# Patient Record
Sex: MALE | Race: WHITE | NOT HISPANIC OR LATINO | Employment: FULL TIME | URBAN - NONMETROPOLITAN AREA
[De-identification: names, ages, dates, MRNs, and addresses within clinical notes are randomized per-mention and may not be internally consistent; named-entity substitution may affect disease eponyms.]

---

## 2018-01-04 ENCOUNTER — OFFICE VISIT (OUTPATIENT)
Dept: RETAIL CLINIC | Facility: CLINIC | Age: 33
End: 2018-01-04

## 2018-01-04 VITALS
WEIGHT: 152 LBS | HEIGHT: 69 IN | OXYGEN SATURATION: 99 % | RESPIRATION RATE: 20 BRPM | TEMPERATURE: 98.1 F | HEART RATE: 88 BPM | BODY MASS INDEX: 22.51 KG/M2

## 2018-01-04 DIAGNOSIS — J06.9 VIRAL UPPER RESPIRATORY TRACT INFECTION: Primary | ICD-10-CM

## 2018-01-04 PROCEDURE — 99203 OFFICE O/P NEW LOW 30 MIN: CPT | Performed by: NURSE PRACTITIONER

## 2018-01-04 RX ORDER — CETIRIZINE HYDROCHLORIDE 10 MG/1
10 TABLET ORAL DAILY
COMMUNITY

## 2018-01-04 RX ORDER — DEXTROMETHORPHAN HYDROBROMIDE AND PROMETHAZINE HYDROCHLORIDE 15; 6.25 MG/5ML; MG/5ML
5 SYRUP ORAL 4 TIMES DAILY PRN
Qty: 120 ML | Refills: 0 | Status: SHIPPED | OUTPATIENT
Start: 2018-01-04 | End: 2018-01-11

## 2018-01-04 RX ORDER — AMOXICILLIN 500 MG/1
500 CAPSULE ORAL 3 TIMES DAILY
Qty: 30 CAPSULE | Refills: 0 | Status: SHIPPED | OUTPATIENT
Start: 2018-01-04 | End: 2018-01-14

## 2018-01-04 NOTE — PROGRESS NOTES
"Valdo Marino is a 32 y.o. male.   Chief Complaint   Patient presents with   • URI      URI    This is a new problem. The current episode started yesterday. The problem has been gradually worsening. There has been no fever. Associated symptoms include congestion, coughing, rhinorrhea and a sore throat. Pertinent negatives include no ear pain, rash, sinus pain or wheezing. Associated symptoms comments: weakness. He has tried acetaminophen for the symptoms. The treatment provided mild relief.        The following portions of the patient's history were reviewed and updated as appropriate: allergies, current medications, past family history, past medical history, past social history, past surgical history and problem list.    Current Outpatient Prescriptions:   •  cetirizine (zyrTEC) 10 MG tablet, Take 10 mg by mouth Daily., Disp: , Rfl:   •  amoxicillin (AMOXIL) 500 MG capsule, Take 1 capsule by mouth 3 (Three) Times a Day for 10 days., Disp: 30 capsule, Rfl: 0  •  promethazine-dextromethorphan (PROMETHAZINE-DM) 6.25-15 MG/5ML syrup, Take 5 mL by mouth 4 (Four) Times a Day As Needed for Cough for up to 7 days., Disp: 120 mL, Rfl: 0    Review of Systems   Constitutional: Positive for activity change, appetite change, chills and fatigue.   HENT: Positive for congestion, postnasal drip, rhinorrhea and sore throat. Negative for ear pain, sinus pain and sinus pressure.    Eyes: Negative.    Respiratory: Positive for cough. Negative for apnea, choking, chest tightness, shortness of breath, wheezing and stridor.    Cardiovascular: Negative.    Gastrointestinal: Negative.    Skin: Negative for rash.     Pulse 88  Temp 98.1 °F (36.7 °C) (Temporal Artery )   Resp 20  Ht 175.3 cm (69\")  Wt 68.9 kg (152 lb)  SpO2 99%  BMI 22.45 kg/m2    Objective   No Known Allergies    Physical Exam   Constitutional: He is oriented to person, place, and time. He appears well-developed and well-nourished. He has a sickly " appearance. No distress.   HENT:   Head: Normocephalic.   Right Ear: External ear normal.   Left Ear: External ear normal.   Nose: Mucosal edema and rhinorrhea present. No sinus tenderness, nasal deformity, septal deviation or nasal septal hematoma. Right sinus exhibits no maxillary sinus tenderness and no frontal sinus tenderness. Left sinus exhibits no maxillary sinus tenderness and no frontal sinus tenderness.   Mouth/Throat: Posterior oropharyngeal erythema present.   Eyes: Conjunctivae are normal.   Neck: Normal range of motion. Neck supple. No JVD present. No tracheal deviation present. No thyromegaly present.   Cardiovascular: Normal rate, regular rhythm and normal heart sounds.  Exam reveals no friction rub.    No murmur heard.  Pulmonary/Chest: Effort normal and breath sounds normal. No stridor. No respiratory distress. He has no wheezes. He has no rales. He exhibits no tenderness.   Lymphadenopathy:     He has no cervical adenopathy.   Neurological: He is alert and oriented to person, place, and time.   Skin: Skin is warm. He is not diaphoretic.   Psychiatric: He has a normal mood and affect. His behavior is normal.   Vitals reviewed.      Assessment/Plan   Cliff was seen today for uri.    Diagnoses and all orders for this visit:    Viral upper respiratory tract infection    Other orders  -     promethazine-dextromethorphan (PROMETHAZINE-DM) 6.25-15 MG/5ML syrup; Take 5 mL by mouth 4 (Four) Times a Day As Needed for Cough for up to 7 days.  -     amoxicillin (AMOXIL) 500 MG capsule; Take 1 capsule by mouth 3 (Three) Times a Day for 10 days.        An After Visit Summary was printed, reviewed, and given to the patient. Understanding verbalized and agrees with treatment plan.  If no improvement or becomes worse, follow up with primary or go to [facility]. Your illness appears viral at this time and will not respond to antibiotics.  Pt voices concern that illness will become bacterial and they will need an  antibiotic.  Antibiotic is prescribed today with the agreement that pt will wait 3-4 days to fill it, and will begin taking it only if illness is not improving or is worse at that time. Pt verbalizes understanding and agrees with treatment plan.           January 4, 2018 5:02 PM

## 2018-01-04 NOTE — PATIENT INSTRUCTIONS
"Your illness appears viral at this time and will not respond to antibiotics.  Pt voices concern that illness will become bacterial and they will need an antibiotic.  Antibiotic is prescribed today with the agreement that pt will wait 3-4 days to fill it, and will begin taking it only if illness is not improving or is worse at that time. Pt verbalizes understanding and agrees with treatment plan.        Upper Respiratory Infection, Adult  Most upper respiratory infections (URIs) are caused by a virus. A URI affects the nose, throat, and upper air passages. The most common type of URI is often called \"the common cold.\"  HOME CARE   · Take medicines only as told by your doctor.  · Gargle warm saltwater or take cough drops to comfort your throat as told by your doctor.  · Use a warm mist humidifier or inhale steam from a shower to increase air moisture. This may make it easier to breathe.  · Drink enough fluid to keep your pee (urine) clear or pale yellow.  · Eat soups and other clear broths.  · Have a healthy diet.  · Rest as needed.  · Go back to work when your fever is gone or your doctor says it is okay.  ¨ You may need to stay home longer to avoid giving your URI to others.  ¨ You can also wear a face mask and wash your hands often to prevent spread of the virus.  · Use your inhaler more if you have asthma.  · Do not use any tobacco products, including cigarettes, chewing tobacco, or electronic cigarettes. If you need help quitting, ask your doctor.  GET HELP IF:  · You are getting worse, not better.  · Your symptoms are not helped by medicine.  · You have chills.  · You are getting more short of breath.  · You have brown or red mucus.  · You have yellow or brown discharge from your nose.  · You have pain in your face, especially when you bend forward.  · You have a fever.  · You have puffy (swollen) neck glands.  · You have pain while swallowing.  · You have white areas in the back of your throat.  GET HELP RIGHT " AWAY IF:   · You have very bad or constant:    Headache.    Ear pain.    Pain in your forehead, behind your eyes, and over your cheekbones (sinus pain).    Chest pain.  · You have long-lasting (chronic) lung disease and any of the following:    Wheezing.    Long-lasting cough.    Coughing up blood.    A change in your usual mucus.  · You have a stiff neck.  · You have changes in your:    Vision.    Hearing.    Thinking.    Mood.  MAKE SURE YOU:   · Understand these instructions.  · Will watch your condition.  · Will get help right away if you are not doing well or get worse.     This information is not intended to replace advice given to you by your health care provider. Make sure you discuss any questions you have with your health care provider.     Document Released: 06/05/2009 Document Revised: 05/03/2016 Document Reviewed: 03/25/2015  [facility] Interactive Patient Education ©2017 [employer]

## 2020-03-12 ENCOUNTER — OFFICE VISIT (OUTPATIENT)
Dept: RETAIL CLINIC | Facility: CLINIC | Age: 35
End: 2020-03-12

## 2020-03-12 VITALS
HEIGHT: 69 IN | SYSTOLIC BLOOD PRESSURE: 142 MMHG | HEART RATE: 72 BPM | OXYGEN SATURATION: 98 % | RESPIRATION RATE: 18 BRPM | BODY MASS INDEX: 26.28 KG/M2 | TEMPERATURE: 97.9 F | WEIGHT: 177.4 LBS | DIASTOLIC BLOOD PRESSURE: 86 MMHG

## 2020-03-12 DIAGNOSIS — J02.9 ACUTE PHARYNGITIS, UNSPECIFIED ETIOLOGY: Primary | ICD-10-CM

## 2020-03-12 LAB
EXPIRATION DATE: NORMAL
INTERNAL CONTROL: NORMAL
Lab: NORMAL
S PYO AG THROAT QL: NEGATIVE

## 2020-03-12 PROCEDURE — 99213 OFFICE O/P EST LOW 20 MIN: CPT | Performed by: NURSE PRACTITIONER

## 2020-03-12 PROCEDURE — 87880 STREP A ASSAY W/OPTIC: CPT | Performed by: NURSE PRACTITIONER

## 2020-03-12 RX ORDER — CYCLOBENZAPRINE HCL 5 MG
TABLET ORAL AS NEEDED
COMMUNITY
Start: 2020-02-05

## 2020-03-12 RX ORDER — TRAMADOL HYDROCHLORIDE 50 MG/1
TABLET ORAL
COMMUNITY
Start: 2020-03-03

## 2020-03-12 RX ORDER — SULINDAC 150 MG/1
TABLET ORAL EVERY 12 HOURS
COMMUNITY
Start: 2019-04-24 | End: 2020-08-25

## 2020-03-12 RX ORDER — FLUTICASONE PROPIONATE 50 MCG
2 SPRAY, SUSPENSION (ML) NASAL DAILY
COMMUNITY

## 2020-03-12 RX ORDER — GABAPENTIN 400 MG/1
400 CAPSULE ORAL 3 TIMES DAILY
COMMUNITY
Start: 2020-03-03

## 2020-03-12 RX ORDER — CLONAZEPAM 0.5 MG/1
TABLET ORAL
COMMUNITY
Start: 2020-03-03

## 2020-03-12 NOTE — PROGRESS NOTES
GREG Marino is a 34 y.o. male.   Chief Complaint   Patient presents with   • Sinusitis      Sinusitis   This is a new problem. The current episode started in the past 7 days. The problem has been waxing and waning since onset. There has been no fever. Associated symptoms include congestion (nasal), coughing (non-productive), headaches (occassional), sinus pressure and a sore throat. Pertinent negatives include no ear pain. Past treatments include nothing.      Cliff Marino  presents to [facility] with cc of sinus congestion and sore throat for 3 days, denies fever. Reviewed the PMFSH. See ROS.  The following portions of the patient's history were reviewed and updated as appropriate: allergies, current medications, past family history, past medical history, past social history, past surgical history and problem list.    Current Outpatient Medications:   •  cetirizine (zyrTEC) 10 MG tablet, Take 10 mg by mouth Daily., Disp: , Rfl:   •  clonazePAM (KlonoPIN) 0.5 MG tablet, , Disp: , Rfl:   •  cyclobenzaprine (FLEXERIL) 5 MG tablet, As Needed., Disp: , Rfl:   •  fluticasone (FLONASE) 50 MCG/ACT nasal spray, 2 sprays into the nostril(s) as directed by provider Daily., Disp: , Rfl:   •  gabapentin (NEURONTIN) 400 MG capsule, , Disp: , Rfl:   •  sulindac (CLINORIL) 150 MG tablet, Take  by mouth Every 12 (Twelve) Hours., Disp: , Rfl:   •  traMADol (ULTRAM) 50 MG tablet, , Disp: , Rfl:     No Known Allergies    Review of Systems   Constitutional: Positive for fatigue. Negative for fever.   HENT: Positive for congestion (nasal), rhinorrhea (clear), sinus pressure and sore throat. Negative for ear pain.    Respiratory: Positive for cough (non-productive). Negative for chest tightness.    Gastrointestinal: Negative for nausea.   Skin: Negative for rash.   Neurological: Positive for headaches (occassional).       Objective     Visit Vitals  /86   Pulse 72   Temp 97.9 °F (36.6 °C) (Temporal)   Resp 18   Ht 175.3  "cm (69\")   Wt 80.5 kg (177 lb 6.4 oz)   SpO2 98%   BMI 26.20 kg/m²         Physical Exam   Constitutional: He is oriented to person, place, and time. He appears well-developed and well-nourished. No distress.   HENT:   Head: Normocephalic and atraumatic.   Right Ear: Tympanic membrane and external ear normal.   Left Ear: Tympanic membrane and external ear normal.   Nose: Mucosal edema present. Right sinus exhibits no maxillary sinus tenderness and no frontal sinus tenderness. Left sinus exhibits no maxillary sinus tenderness and no frontal sinus tenderness.   Mouth/Throat: Uvula is midline and mucous membranes are normal. Posterior oropharyngeal erythema present. No oropharyngeal exudate. Tonsils are 1+ on the right. Tonsils are 1+ on the left. No tonsillar exudate.   Eyes: Pupils are equal, round, and reactive to light. Conjunctivae and EOM are normal.   Neck: Normal range of motion. Neck supple.   Cardiovascular: Normal rate, regular rhythm and normal heart sounds.   Pulmonary/Chest: Effort normal and breath sounds normal. No respiratory distress.   Abdominal: Soft. Bowel sounds are normal.   Musculoskeletal: Normal range of motion.   Lymphadenopathy:     He has no cervical adenopathy.   Neurological: He is alert and oriented to person, place, and time.   Skin: Skin is warm and dry. No rash noted.   Psychiatric: He has a normal mood and affect. His behavior is normal. Judgment and thought content normal.   Nursing note and vitals reviewed.      Lab Results (last 24 hours)     Procedure Component Value Units Date/Time    POCT rapid strep A [[id number]]  (Normal) Collected:  03/12/20 1719    Specimen:  Swab Updated:  03/12/20 1719     Rapid Strep A Screen Negative     Internal Control Passed     Lot Number [id number]     Expiration Date 4/30/21          Assessment/Plan   Cliff was seen today for sinusitis.    Diagnoses and all orders for this visit:    Acute pharyngitis, unspecified etiology  -     POCT rapid strep " A

## 2020-03-12 NOTE — PATIENT INSTRUCTIONS
Pharyngitis    Pharyngitis is redness, pain, and swelling (inflammation) of the throat (pharynx). It is a very common cause of sore throat. Pharyngitis can be caused by a bacteria, but it is usually caused by a virus. Most cases of pharyngitis get better on their own without treatment.  What are the causes?  This condition may be caused by:  · Infection by viruses (viral). Viral pharyngitis spreads from person to person (is contagious) through coughing, sneezing, and sharing of personal items or utensils such as cups, forks, spoons, and toothbrushes.  · Infection by bacteria (bacterial). Bacterial pharyngitis may be spread by touching the nose or face after coming in contact with the bacteria, or through more intimate contact, such as kissing.  · Allergies. Allergies can cause buildup of mucus in the throat (post-nasal drip), leading to inflammation and irritation. Allergies can also cause blocked nasal passages, forcing breathing through the mouth, which dries and irritates the throat.  What increases the risk?  You are more likely to develop this condition if:  · You are 5-24 years old.  · You are exposed to crowded environments such as , school, or dormitory living.  · You live in a cold climate.  · You have a weakened disease-fighting (immune) system.  What are the signs or symptoms?  Symptoms of this condition vary by the cause (viral, bacterial, or allergies) and can include:  · Sore throat.  · Fatigue.  · Low-grade fever.  · Headache.  · Joint pain and muscle aches.  · Skin rashes.  · Swollen glands in the throat (lymph nodes).  · Plaque-like film on the throat or tonsils. This is often a symptom of bacterial pharyngitis.  · Vomiting.  · Stuffy nose (nasal congestion).  · Cough.  · Red, itchy eyes (conjunctivitis).  · Loss of appetite.  How is this diagnosed?  This condition is often diagnosed based on your medical history and a physical exam. Your health care provider will ask you questions about your  illness and your symptoms. A swab of your throat may be done to check for bacteria (rapid strep test). Other lab tests may also be done, depending on the suspected cause, but these are rare.  How is this treated?  This condition usually gets better in 3-4 days without medicine. Bacterial pharyngitis may be treated with antibiotic medicines.  Follow these instructions at home:  · Take over-the-counter and prescription medicines only as told by your health care provider.  ? If you were prescribed an antibiotic medicine, take it as told by your health care provider. Do not stop taking the antibiotic even if you start to feel better.  ? Do not give children aspirin because of the association with Reye syndrome.  · Drink enough water and fluids to keep your urine clear or pale yellow.  · Get a lot of rest.  · Gargle with a salt-water mixture 3-4 times a day or as needed. To make a salt-water mixture, completely dissolve ½-1 tsp of salt in 1 cup of warm water.  · If your health care provider approves, you may use throat lozenges or sprays to soothe your throat.  Contact a health care provider if:  · You have large, tender lumps in your neck.  · You have a rash.  · You cough up green, yellow-brown, or bloody spit.  Get help right away if:  · Your neck becomes stiff.  · You drool or are unable to swallow liquids.  · You cannot drink or take medicines without vomiting.  · You have severe pain that does not go away, even after you take medicine.  · You have trouble breathing, and it is not caused by a stuffy nose.  · You have new pain and swelling in your joints such as the knees, ankles, wrists, or elbows.  Summary  · Pharyngitis is redness, pain, and swelling (inflammation) of the throat (pharynx).  · While pharyngitis can be caused by a bacteria, the most common causes are viral.  · Most cases of pharyngitis get better on their own without treatment.  · Bacterial pharyngitis is treated with antibiotic medicines.  This  information is not intended to replace advice given to you by your health care provider. Make sure you discuss any questions you have with your health care provider.  Document Released: 12/18/2006 Document Revised: 01/23/2018 Document Reviewed: 01/23/2018  [employer] Interactive Patient Education © 2020 [employer]

## 2020-08-17 ENCOUNTER — OFFICE VISIT (OUTPATIENT)
Dept: ORTHOPEDIC SURGERY | Facility: CLINIC | Age: 35
End: 2020-08-17

## 2020-08-17 VITALS
HEIGHT: 69 IN | BODY MASS INDEX: 26.29 KG/M2 | HEART RATE: 86 BPM | SYSTOLIC BLOOD PRESSURE: 127 MMHG | TEMPERATURE: 98.2 F | DIASTOLIC BLOOD PRESSURE: 77 MMHG | WEIGHT: 177.47 LBS

## 2020-08-17 DIAGNOSIS — Z01.818 PREOPERATIVE TESTING: ICD-10-CM

## 2020-08-17 DIAGNOSIS — G56.01 CARPAL TUNNEL SYNDROME OF RIGHT WRIST: Primary | ICD-10-CM

## 2020-08-17 PROCEDURE — 99203 OFFICE O/P NEW LOW 30 MIN: CPT | Performed by: ORTHOPAEDIC SURGERY

## 2020-08-17 NOTE — PROGRESS NOTES
History & Physical      Patient: Cliff Marino  YOB: 1985  Date of Encounter: 2020        Chief Complaint:   Chief Complaint   Patient presents with   • Right Hand - Numbness, Pain, Tingling, Initial Evaluation   • Left Hand - Tingling, Pain, Numbness, Initial Evaluation           HPI:   Cliff Marino, 34 y.o. male, referred by BRIJESH Peralta presents evaluation of bilateral hand pain and numbness he has had this for at least 5 years.  Right is remained worse than the left he has worn night braces over 2 years and reports it makes his hands better but tubes do not resolve.  He reports pain at night numbness and tingling in all fingers but greatest thumb index and middle fingers he reports he is losing his  strength and he has difficulty with driving vehicle because of the numbness and tingling.  Been on gabapentin for the past 4 years.  He has been on tramadol the past 2 years.        Active Problem List:  Patient Active Problem List   Diagnosis   • Carpal tunnel syndrome of right wrist           Past Medical History:  Past Medical History:   Diagnosis Date   • Allergic rhinitis    • Carpal tunnel syndrome    • Fibromyalgia            Past Surgical History:  Past Surgical History:   Procedure Laterality Date   • NO PAST SURGERIES             Family History:  Family History   Problem Relation Age of Onset   • Cancer Maternal Grandfather    • Diabetes Maternal Grandfather    • Cancer Paternal Grandmother            Social History:  Social History     Socioeconomic History   • Marital status:      Spouse name: Not on file   • Number of children: Not on file   • Years of education: Not on file   • Highest education level: Not on file   Tobacco Use   • Smoking status: Former Smoker     Packs/day: 1.00     Years: 8.00     Pack years: 8.00     Types: Cigarettes     Last attempt to quit: 2011     Years since quittin.6   • Smokeless tobacco: Never Used   Substance and Sexual  Activity   • Alcohol use: No   • Drug use: No   • Sexual activity: Defer     Comment: , 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      Medications:  Current Outpatient Medications   Medication Sig Dispense Refill   • cetirizine (zyrTEC) 10 MG tablet Take 10 mg by mouth Daily.     • clonazePAM (KlonoPIN) 0.5 MG tablet      • cyclobenzaprine (FLEXERIL) 5 MG tablet As Needed.     • fluticasone (FLONASE) 50 MCG/ACT nasal spray 2 sprays into the nostril(s) as directed by provider Daily.     • gabapentin (NEURONTIN) 400 MG capsule      • traMADol (ULTRAM) 50 MG tablet      • sulindac (CLINORIL) 150 MG tablet Take  by mouth Every 12 (Twelve) Hours.       No current facility-administered medications for this visit.            Allergies:  No Known Allergies        Review of Systems:   Review of Systems   HENT: Positive for sinus pain.    Eyes: Negative.    Respiratory: Negative.    Cardiovascular: Positive for leg swelling.   Gastrointestinal: Negative.    Endocrine: Negative.    Genitourinary: Negative.    Musculoskeletal: Positive for arthralgias, back pain and joint swelling.   Skin: Negative.    Allergic/Immunologic: Negative.    Neurological: Positive for weakness and numbness.   Hematological: Negative.    Psychiatric/Behavioral: The patient is nervous/anxious.            Physical Exam:   Physical Exam   Constitutional: He is oriented to person, place, and time. He appears well-developed and well-nourished.   HENT:   Head: Normocephalic and atraumatic.   Mouth/Throat: Oropharynx is clear and moist.   Eyes: Pupils are equal, round, and reactive to light. Conjunctivae and EOM are normal.   Neck: Normal range of motion. Neck supple. No JVD present. No thyromegaly present.   Cardiovascular: Normal rate, regular rhythm and normal heart sounds. Exam reveals no gallop and no friction rub.   No murmur heard.  Pulmonary/Chest: Effort normal and breath sounds normal. No respiratory distress. He has  "no wheezes. He has no rales. He exhibits no tenderness.   Abdominal: Bowel sounds are normal.   Lymphadenopathy:     He has no cervical adenopathy.   Neurological: He is alert and oriented to person, place, and time.   Skin: Skin is warm and dry.   Psychiatric: He has a normal mood and affect.   Nursing note and vitals reviewed.    GENERAL: 34 y.o. male, alert and oriented X 3 in no acute distress.   Visit Vitals  /77   Pulse 86   Temp 98.2 °F (36.8 °C)   Ht 175.3 cm (69\")   Wt 80.5 kg (177 lb 7.5 oz)   BMI 26.21 kg/m²         Musculoskeletal:   Examination bilateral hands reveals normal thenar tone symmetrical right compared to the left he has full strength with  and pinch.  He demonstrates moderately positive Phalen sign right greater than the left.  Decreased sensation thumb index and middle fingers bilaterally right greater than left.      EMG nerve conduction studies by report scribe moderate carpal tunnel syndrome right greater than left.      Assessment & Plan:   34 y.o. male presents 5-year history of bilateral hand pain and numbness with clinical findings supporting his EMG nerve conduction studies identifying carpal tunnel syndrome of a moderate degree bilateral right greater than left we discussed his options and after that discussion he wishes to proceed with carpal tunnel release right hand he is scheduled [facility] August 27, 2020.        ICD-10-CM ICD-9-CM   1. Carpal tunnel syndrome of right wrist G56.01 354.0   2. Preoperative testing Z01.818 V72.84             Cc:   BRIJESH Peralta                This document has been electronically signed by Chidi Gastelum MD   August 20, 2020 22:40      "

## 2020-08-18 ENCOUNTER — TELEPHONE (OUTPATIENT)
Dept: ORTHOPEDIC SURGERY | Facility: CLINIC | Age: 35
End: 2020-08-18

## 2020-08-18 PROBLEM — G56.01 CARPAL TUNNEL SYNDROME OF RIGHT WRIST: Status: ACTIVE | Noted: 2020-08-18

## 2020-08-25 ENCOUNTER — LAB (OUTPATIENT)
Dept: LAB | Facility: HOSPITAL | Age: 35
End: 2020-08-25

## 2020-08-25 ENCOUNTER — APPOINTMENT (OUTPATIENT)
Dept: PREADMISSION TESTING | Facility: HOSPITAL | Age: 35
End: 2020-08-25

## 2020-08-25 DIAGNOSIS — G56.01 CARPAL TUNNEL SYNDROME OF RIGHT WRIST: ICD-10-CM

## 2020-08-25 DIAGNOSIS — Z01.818 PREOPERATIVE TESTING: ICD-10-CM

## 2020-08-25 LAB
ANION GAP SERPL CALCULATED.3IONS-SCNC: 11.3 MMOL/L (ref 5–15)
BUN SERPL-MCNC: 8 MG/DL (ref 6–20)
BUN/CREAT SERPL: 8.3 (ref 7–25)
CALCIUM SPEC-SCNC: 9.5 MG/DL (ref 8.6–10.5)
CHLORIDE SERPL-SCNC: 101 MMOL/L (ref 98–107)
CO2 SERPL-SCNC: 26.7 MMOL/L (ref 22–29)
CREAT SERPL-MCNC: 0.96 MG/DL (ref 0.76–1.27)
DEPRECATED RDW RBC AUTO: 40 FL (ref 37–54)
ERYTHROCYTE [DISTWIDTH] IN BLOOD BY AUTOMATED COUNT: 12.2 % (ref 12.3–15.4)
GFR SERPL CREATININE-BSD FRML MDRD: 89 ML/MIN/1.73
GLUCOSE SERPL-MCNC: 91 MG/DL (ref 65–99)
HCT VFR BLD AUTO: 42.5 % (ref 37.5–51)
HGB BLD-MCNC: 13.9 G/DL (ref 13–17.7)
MCH RBC QN AUTO: 29.4 PG (ref 26.6–33)
MCHC RBC AUTO-ENTMCNC: 32.7 G/DL (ref 31.5–35.7)
MCV RBC AUTO: 89.9 FL (ref 79–97)
PLATELET # BLD AUTO: 338 10*3/MM3 (ref 140–450)
PMV BLD AUTO: 9.5 FL (ref 6–12)
POTASSIUM SERPL-SCNC: 3.6 MMOL/L (ref 3.5–5.2)
RBC # BLD AUTO: 4.73 10*6/MM3 (ref 4.14–5.8)
SODIUM SERPL-SCNC: 139 MMOL/L (ref 136–145)
WBC # BLD AUTO: 8.6 10*3/MM3 (ref 3.4–10.8)

## 2020-08-25 PROCEDURE — U0004 COV-19 TEST NON-CDC HGH THRU: HCPCS

## 2020-08-25 PROCEDURE — 80048 BASIC METABOLIC PNL TOTAL CA: CPT | Performed by: ANESTHESIOLOGY

## 2020-08-25 PROCEDURE — 85027 COMPLETE CBC AUTOMATED: CPT | Performed by: ANESTHESIOLOGY

## 2020-08-25 PROCEDURE — U0002 COVID-19 LAB TEST NON-CDC: HCPCS

## 2020-08-25 PROCEDURE — C9803 HOPD COVID-19 SPEC COLLECT: HCPCS

## 2020-08-25 PROCEDURE — 36415 COLL VENOUS BLD VENIPUNCTURE: CPT

## 2020-08-26 LAB
REF LAB TEST METHOD: NORMAL
SARS-COV-2 RNA RESP QL NAA+PROBE: NOT DETECTED

## 2020-08-27 ENCOUNTER — ANESTHESIA (OUTPATIENT)
Dept: PERIOP | Facility: HOSPITAL | Age: 35
End: 2020-08-27

## 2020-08-27 ENCOUNTER — HOSPITAL ENCOUNTER (OUTPATIENT)
Facility: HOSPITAL | Age: 35
Setting detail: HOSPITAL OUTPATIENT SURGERY
Discharge: HOME | End: 2020-08-27
Attending: ORTHOPAEDIC SURGERY | Admitting: ANESTHESIOLOGY

## 2020-08-27 ENCOUNTER — ANESTHESIA EVENT (OUTPATIENT)
Dept: PERIOP | Facility: HOSPITAL | Age: 35
End: 2020-08-27

## 2020-08-27 VITALS
SYSTOLIC BLOOD PRESSURE: 123 MMHG | DIASTOLIC BLOOD PRESSURE: 85 MMHG | RESPIRATION RATE: 18 BRPM | HEIGHT: 69 IN | OXYGEN SATURATION: 100 % | WEIGHT: 170 LBS | BODY MASS INDEX: 25.18 KG/M2 | TEMPERATURE: 98 F | HEART RATE: 65 BPM

## 2020-08-27 DIAGNOSIS — G56.01 CARPAL TUNNEL SYNDROME OF RIGHT WRIST: ICD-10-CM

## 2020-08-27 PROCEDURE — 25010000002 FENTANYL CITRATE (PF) 100 MCG/2ML SOLUTION: Performed by: NURSE ANESTHETIST, CERTIFIED REGISTERED

## 2020-08-27 PROCEDURE — 25010000002 ONDANSETRON PER 1 MG: Performed by: NURSE ANESTHETIST, CERTIFIED REGISTERED

## 2020-08-27 PROCEDURE — 64721 CARPAL TUNNEL SURGERY: CPT | Performed by: ORTHOPAEDIC SURGERY

## 2020-08-27 PROCEDURE — 25010000002 PROPOFOL 10 MG/ML EMULSION: Performed by: NURSE ANESTHETIST, CERTIFIED REGISTERED

## 2020-08-27 PROCEDURE — 25010000003 LIDOCAINE 1 % SOLUTION: Performed by: ORTHOPAEDIC SURGERY

## 2020-08-27 PROCEDURE — 25010000002 MIDAZOLAM PER 1 MG: Performed by: NURSE ANESTHETIST, CERTIFIED REGISTERED

## 2020-08-27 RX ORDER — MIDAZOLAM HYDROCHLORIDE 1 MG/ML
INJECTION INTRAMUSCULAR; INTRAVENOUS AS NEEDED
Status: DISCONTINUED | OUTPATIENT
Start: 2020-08-27 | End: 2020-08-27 | Stop reason: SURG

## 2020-08-27 RX ORDER — FENTANYL CITRATE 50 UG/ML
INJECTION, SOLUTION INTRAMUSCULAR; INTRAVENOUS AS NEEDED
Status: DISCONTINUED | OUTPATIENT
Start: 2020-08-27 | End: 2020-08-27 | Stop reason: SURG

## 2020-08-27 RX ORDER — OXYCODONE HYDROCHLORIDE AND ACETAMINOPHEN 5; 325 MG/1; MG/1
1 TABLET ORAL ONCE AS NEEDED
Status: DISCONTINUED | OUTPATIENT
Start: 2020-08-27 | End: 2020-08-27 | Stop reason: HOSPADM

## 2020-08-27 RX ORDER — BUPIVACAINE HYDROCHLORIDE 5 MG/ML
INJECTION, SOLUTION PERINEURAL AS NEEDED
Status: DISCONTINUED | OUTPATIENT
Start: 2020-08-27 | End: 2020-08-27 | Stop reason: HOSPADM

## 2020-08-27 RX ORDER — SODIUM CHLORIDE, SODIUM LACTATE, POTASSIUM CHLORIDE, CALCIUM CHLORIDE 600; 310; 30; 20 MG/100ML; MG/100ML; MG/100ML; MG/100ML
125 INJECTION, SOLUTION INTRAVENOUS CONTINUOUS
Status: DISCONTINUED | OUTPATIENT
Start: 2020-08-27 | End: 2020-08-27 | Stop reason: HOSPADM

## 2020-08-27 RX ORDER — OXYCODONE HYDROCHLORIDE AND ACETAMINOPHEN 5; 325 MG/1; MG/1
1 TABLET ORAL EVERY 4 HOURS PRN
Qty: 16 TABLET | Refills: 0 | Status: ON HOLD | OUTPATIENT
Start: 2020-08-27 | End: 2020-09-17 | Stop reason: SDUPTHER

## 2020-08-27 RX ORDER — FENTANYL CITRATE 50 UG/ML
50 INJECTION, SOLUTION INTRAMUSCULAR; INTRAVENOUS
Status: DISCONTINUED | OUTPATIENT
Start: 2020-08-27 | End: 2020-08-27 | Stop reason: HOSPADM

## 2020-08-27 RX ORDER — SODIUM CHLORIDE 0.9 % (FLUSH) 0.9 %
10 SYRINGE (ML) INJECTION AS NEEDED
Status: DISCONTINUED | OUTPATIENT
Start: 2020-08-27 | End: 2020-08-27 | Stop reason: HOSPADM

## 2020-08-27 RX ORDER — MIDAZOLAM HYDROCHLORIDE 1 MG/ML
1 INJECTION INTRAMUSCULAR; INTRAVENOUS
Status: DISCONTINUED | OUTPATIENT
Start: 2020-08-27 | End: 2020-08-27 | Stop reason: HOSPADM

## 2020-08-27 RX ORDER — IPRATROPIUM BROMIDE AND ALBUTEROL SULFATE 2.5; .5 MG/3ML; MG/3ML
3 SOLUTION RESPIRATORY (INHALATION) ONCE AS NEEDED
Status: DISCONTINUED | OUTPATIENT
Start: 2020-08-27 | End: 2020-08-27 | Stop reason: HOSPADM

## 2020-08-27 RX ORDER — LIDOCAINE HYDROCHLORIDE 20 MG/ML
INJECTION, SOLUTION INFILTRATION; PERINEURAL AS NEEDED
Status: DISCONTINUED | OUTPATIENT
Start: 2020-08-27 | End: 2020-08-27 | Stop reason: SURG

## 2020-08-27 RX ORDER — ONDANSETRON 2 MG/ML
INJECTION INTRAMUSCULAR; INTRAVENOUS AS NEEDED
Status: DISCONTINUED | OUTPATIENT
Start: 2020-08-27 | End: 2020-08-27 | Stop reason: SURG

## 2020-08-27 RX ORDER — SODIUM CHLORIDE 0.9 % (FLUSH) 0.9 %
10 SYRINGE (ML) INJECTION EVERY 12 HOURS SCHEDULED
Status: DISCONTINUED | OUTPATIENT
Start: 2020-08-27 | End: 2020-08-27 | Stop reason: HOSPADM

## 2020-08-27 RX ORDER — LIDOCAINE HYDROCHLORIDE 10 MG/ML
INJECTION, SOLUTION INFILTRATION; PERINEURAL AS NEEDED
Status: DISCONTINUED | OUTPATIENT
Start: 2020-08-27 | End: 2020-08-27 | Stop reason: HOSPADM

## 2020-08-27 RX ORDER — PROPOFOL 10 MG/ML
VIAL (ML) INTRAVENOUS AS NEEDED
Status: DISCONTINUED | OUTPATIENT
Start: 2020-08-27 | End: 2020-08-27 | Stop reason: SURG

## 2020-08-27 RX ORDER — MAGNESIUM HYDROXIDE 1200 MG/15ML
LIQUID ORAL AS NEEDED
Status: DISCONTINUED | OUTPATIENT
Start: 2020-08-27 | End: 2020-08-27 | Stop reason: HOSPADM

## 2020-08-27 RX ORDER — ONDANSETRON 2 MG/ML
4 INJECTION INTRAMUSCULAR; INTRAVENOUS AS NEEDED
Status: DISCONTINUED | OUTPATIENT
Start: 2020-08-27 | End: 2020-08-27 | Stop reason: HOSPADM

## 2020-08-27 RX ORDER — MEPERIDINE HYDROCHLORIDE 25 MG/ML
12.5 INJECTION INTRAMUSCULAR; INTRAVENOUS; SUBCUTANEOUS
Status: DISCONTINUED | OUTPATIENT
Start: 2020-08-27 | End: 2020-08-27 | Stop reason: HOSPADM

## 2020-08-27 RX ORDER — FAMOTIDINE 10 MG/ML
INJECTION, SOLUTION INTRAVENOUS AS NEEDED
Status: DISCONTINUED | OUTPATIENT
Start: 2020-08-27 | End: 2020-08-27 | Stop reason: SURG

## 2020-08-27 RX ADMIN — PROPOFOL 150 MCG/KG/MIN: 10 INJECTION, EMULSION INTRAVENOUS at 10:46

## 2020-08-27 RX ADMIN — FAMOTIDINE 20 MG: 10 INJECTION INTRAVENOUS at 10:46

## 2020-08-27 RX ADMIN — MIDAZOLAM HYDROCHLORIDE 2 MG: 1 INJECTION, SOLUTION INTRAMUSCULAR; INTRAVENOUS at 10:43

## 2020-08-27 RX ADMIN — SODIUM CHLORIDE, POTASSIUM CHLORIDE, SODIUM LACTATE AND CALCIUM CHLORIDE: 600; 310; 30; 20 INJECTION, SOLUTION INTRAVENOUS at 11:24

## 2020-08-27 RX ADMIN — PROPOFOL 30 MG: 10 INJECTION, EMULSION INTRAVENOUS at 10:46

## 2020-08-27 RX ADMIN — FENTANYL CITRATE 100 MCG: 50 INJECTION INTRAMUSCULAR; INTRAVENOUS at 10:43

## 2020-08-27 RX ADMIN — ONDANSETRON 4 MG: 2 INJECTION INTRAMUSCULAR; INTRAVENOUS at 10:46

## 2020-08-27 RX ADMIN — LIDOCAINE HYDROCHLORIDE 60 MG: 20 INJECTION, SOLUTION INFILTRATION; PERINEURAL at 10:43

## 2020-08-27 RX ADMIN — SODIUM CHLORIDE, POTASSIUM CHLORIDE, SODIUM LACTATE AND CALCIUM CHLORIDE 125 ML/HR: 600; 310; 30; 20 INJECTION, SOLUTION INTRAVENOUS at 09:23

## 2020-08-27 NOTE — ANESTHESIA POSTPROCEDURE EVALUATION
Patient: Cliff Marino    Procedure Summary     Date:  08/27/20 Room / Location:  [facility] OR  /  [facility] OR    Anesthesia Start:  1042 Anesthesia Stop:  1127    Procedure:  RIGHT CARPAL TUNNEL RELEASE (Right Wrist) Diagnosis:       Carpal tunnel syndrome of right wrist      (Carpal tunnel syndrome of right wrist [G56.01])    Surgeon:  Chidi Gastelum MD Provider:  Kilo Miller MD    Anesthesia Type:  MAC ASA Status:  2          Anesthesia Type: MAC    Vitals  Vitals Value Taken Time   /87 8/27/2020 11:41 AM   Temp 97.2 °F (36.2 °C) 8/27/2020 11:27 AM   Pulse 60 8/27/2020 11:41 AM   Resp 14 8/27/2020 11:41 AM   SpO2 97 % 8/27/2020 11:41 AM           Post Anesthesia Care and Evaluation    Patient location during evaluation: PHASE II  Patient participation: complete - patient participated  Level of consciousness: awake and alert  Pain score: 1  Pain management: adequate  Airway patency: patent  Anesthetic complications: No anesthetic complications  PONV Status: controlled  Cardiovascular status: acceptable  Respiratory status: acceptable  Hydration status: acceptable

## 2020-08-27 NOTE — ANESTHESIA PREPROCEDURE EVALUATION
Anesthesia Evaluation     Patient summary reviewed and Nursing notes reviewed   no history of anesthetic complications:  NPO Solid Status: > 8 hours  NPO Liquid Status: > 8 hours           Airway   Mallampati: II  TM distance: >3 FB  Neck ROM: full  No difficulty expected  Dental - normal exam   (+) partials and upper dentures    Pulmonary - normal exam   (+) a smoker Former,   Cardiovascular - negative cardio ROS and normal exam  Exercise tolerance: good (4-7 METS)    NYHA Classification: II        Neuro/Psych- negative ROS  GI/Hepatic/Renal/Endo - negative ROS     Musculoskeletal     (+) myalgias,   Abdominal  - normal exam    Bowel sounds: normal.   Substance History - negative use     OB/GYN negative ob/gyn ROS         Other - negative ROS                     Anesthesia Plan    ASA 2     MAC       Anesthetic plan, all risks, benefits, and alternatives have been provided, discussed and informed consent has been obtained with: patient.    Plan discussed with CRNA.

## 2020-09-02 ENCOUNTER — OFFICE VISIT (OUTPATIENT)
Dept: ORTHOPEDIC SURGERY | Facility: CLINIC | Age: 35
End: 2020-09-02

## 2020-09-02 VITALS — WEIGHT: 170 LBS | HEIGHT: 69 IN | BODY MASS INDEX: 25.18 KG/M2 | TEMPERATURE: 97.8 F

## 2020-09-02 DIAGNOSIS — Z01.818 PREOPERATIVE TESTING: ICD-10-CM

## 2020-09-02 DIAGNOSIS — G56.02 CARPAL TUNNEL SYNDROME, LEFT: Primary | ICD-10-CM

## 2020-09-02 PROCEDURE — 99024 POSTOP FOLLOW-UP VISIT: CPT | Performed by: ORTHOPAEDIC SURGERY

## 2020-09-02 PROCEDURE — 99214 OFFICE O/P EST MOD 30 MIN: CPT | Performed by: ORTHOPAEDIC SURGERY

## 2020-09-02 NOTE — PROGRESS NOTES
Follow-up Visit         Patient: Cliff Marino  YOB: 1985  Date of Encounter: 2020      Chief  Complaint:   Chief Complaint   Patient presents with   • Right Wrist - Follow-up, Post-op, Pain, Numbness     20 (6 days post-op)     Chidi Gastelum MD      Right Carpal Tunnel Release - Right               HPI:  Cliff Marino, 34 y.o. male presents        Medical History:  Patient Active Problem List   Diagnosis   • Carpal tunnel syndrome of right wrist     Past Medical History:   Diagnosis Date   • Allergic rhinitis    • Carpal tunnel syndrome    • Fibromyalgia            Social History:  Social History     Socioeconomic History   • Marital status:      Spouse name: Not on file   • Number of children: Not on file   • Years of education: Not on file   • Highest education level: Not on file   Tobacco Use   • Smoking status: Former Smoker     Packs/day: 1.00     Years: 8.00     Pack years: 8.00     Types: Cigarettes     Last attempt to quit: 2011     Years since quittin.6   • Smokeless tobacco: Never Used   Substance and Sexual Activity   • Alcohol use: No   • Drug use: No   • Sexual activity: Defer     Comment: , 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 male presents         No diagnosis found.      Procedures        Cc:  Zeny Ritchie, APRN              This document has been electronically signed by Chidi Gastelum MD   2020 15:32

## 2020-09-02 NOTE — PROGRESS NOTES
History and Physical      Patient: Cliff Marino  YOB: 1985  Date of Encounter: 09/02/2020      Chief Complaint:   Chief Complaint   Patient presents with   • Right Wrist - Follow-up, Post-op, Pain, Numbness     08/27/20 (6 days post-op)     Chidi Gastelum MD      Right Carpal Tunnel Release - Right       • Left Wrist - Numbness, Pain           HPI:   Cliff Marino, 34 y.o. male, presents in follow-up for tunnel release right hand August 27, 2020 doing well numbness in his hand has just about completely resolved pain is much better he has some discomfort in his palm.  He has bilateral carpal tunnel syndrome has worn night braces for over 2 years has experienced pain at night with numbness and tingling in all fingers now improved on the right he is wishing to consider left carpal tunnel surgery.  He has been on gabapentin for 4 years and on tramadol for 2 years.        Active Problem List:  Patient Active Problem List   Diagnosis   • Carpal tunnel syndrome of right wrist   • Carpal tunnel syndrome, left           Past Medical History:  Past Medical History:   Diagnosis Date   • Allergic rhinitis    • Carpal tunnel syndrome    • Fibromyalgia            Past Surgical History:  Past Surgical History:   Procedure Laterality Date   • CARPAL TUNNEL RELEASE Right 8/27/2020    Procedure: RIGHT CARPAL TUNNEL RELEASE;  Surgeon: Chidi Gastelum MD;  Location: [facility];  Service: Orthopedics;  Laterality: Right;   • NO PAST SURGERIES             Family History:  Family History   Problem Relation Age of Onset   • Cancer Maternal Grandfather    • Diabetes Maternal Grandfather    • Cancer Paternal Grandmother            Social History:  Social History     Socioeconomic History   • Marital status:      Spouse name: Not on file   • Number of children: Not on file   • Years of education: Not on file   • Highest education level: Not on file   Tobacco Use   • Smoking status: Former Smoker      Packs/day: 1.00     Years: 8.00     Pack years: 8.00     Types: Cigarettes     Last attempt to quit: 2011     Years since quittin.6   • Smokeless tobacco: Never Used   Substance and Sexual Activity   • Alcohol use: No   • Drug use: No   • Sexual activity: Defer     Comment: , 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        Medications:  Current Outpatient Medications   Medication Sig Dispense Refill   • cetirizine (zyrTEC) 10 MG tablet Take 10 mg by mouth Daily.     • clonazePAM (KlonoPIN) 0.5 MG tablet      • cyclobenzaprine (FLEXERIL) 5 MG tablet As Needed.     • fluticasone (FLONASE) 50 MCG/ACT nasal spray 2 sprays into the nostril(s) as directed by provider Daily.     • gabapentin (NEURONTIN) 400 MG capsule Take 400 mg by mouth 3 (Three) Times a Day.     • oxyCODONE-acetaminophen (PERCOCET) 5-325 MG per tablet Take 1 tablet by mouth Every 4 (Four) Hours As Needed (Pain). 16 tablet 0   • traMADol (ULTRAM) 50 MG tablet        No current facility-administered medications for this visit.            Allergies:  No Known Allergies        Review of Systems:   Review of Systems   HENT: Positive for sinus pain.    Eyes: Negative.    Respiratory: Negative.    Cardiovascular: Positive for leg swelling.   Gastrointestinal: Negative.    Endocrine: Negative.    Genitourinary: Negative.    Musculoskeletal: Positive for arthralgias, back pain and joint swelling.   Skin: Negative.    Allergic/Immunologic: Negative.    Neurological: Positive for weakness and numbness.   Hematological: Negative.    Psychiatric/Behavioral: The patient is nervous/anxious.            Physical Exam:   Physical Exam   Constitutional: He is oriented to person, place, and time. No distress.   HENT:   Head: Normocephalic and atraumatic.   Right Ear: External ear normal.   Left Ear: External ear normal.   Eyes: Conjunctivae and EOM are normal. Right eye exhibits no discharge. Left eye exhibits no discharge.   Neck:  "Normal range of motion. Neck supple.   Cardiovascular: Normal rate, regular rhythm and normal heart sounds.   No murmur heard.  Pulmonary/Chest: Effort normal and breath sounds normal. No respiratory distress. He has no wheezes.   Abdominal: Soft. He exhibits no distension. There is no guarding.   Neurological: He is alert and oriented to person, place, and time.   Skin: Skin is warm and dry. Capillary refill takes less than 2 seconds. He is not diaphoretic.   Psychiatric: He has a normal mood and affect. His behavior is normal. Judgment and thought content normal.   Nursing note and vitals reviewed.    GENERAL: 34 y.o. male, alert and oriented X 3 in no acute distress.   Visit Vitals  Temp 97.8 °F (36.6 °C)   Ht 175.3 cm (69\")   Wt 77.1 kg (170 lb)   BMI 25.10 kg/m²         Musculoskeletal:   Examination right hand shows midline incision intact without surrounding erythema with intact sensation to his thumb index and middle fingers.    Left hand evaluation shows diminished sensation thumb index and middle fingers with moderately positive Phalen sign.  Normal radial pulse.      Radiology/Labs:    Previous EMG nerve conduction studies by report describe moderate carpal tunnel syndrome bilaterally right greater than left.    Assessment & Plan:   34 y.o. male presents in follow-up carpal tunnel release right hand doing well he wishes to schedule carpal tunnel release left hand so that he can recover from both injuries without missing too much work.  Today sutures are removed from his right hand we will make tentative plans for carpal tunnel release left hand September 17 of 2020.      ICD-10-CM ICD-9-CM   1. Carpal tunnel syndrome, left G56.02 354.0   2. Preoperative testing Z01.818 V72.84           Cc:   Zeny Ritchie, APRN              This document has been electronically signed by Chidi Gastelum MD   September 4, 2020 16:52                "

## 2020-09-02 NOTE — H&P (VIEW-ONLY)
History and Physical      Patient: Cliff Marino  YOB: 1985  Date of Encounter: 09/02/2020      Chief Complaint:   Chief Complaint   Patient presents with   • Right Wrist - Follow-up, Post-op, Pain, Numbness     08/27/20 (6 days post-op)     Chidi Gastelum MD      Right Carpal Tunnel Release - Right       • Left Wrist - Numbness, Pain           HPI:   Cliff Marino, 34 y.o. male, presents in follow-up for tunnel release right hand August 27, 2020 doing well numbness in his hand has just about completely resolved pain is much better he has some discomfort in his palm.  He has bilateral carpal tunnel syndrome has worn night braces for over 2 years has experienced pain at night with numbness and tingling in all fingers now improved on the right he is wishing to consider left carpal tunnel surgery.  He has been on gabapentin for 4 years and on tramadol for 2 years.        Active Problem List:  Patient Active Problem List   Diagnosis   • Carpal tunnel syndrome of right wrist   • Carpal tunnel syndrome, left           Past Medical History:  Past Medical History:   Diagnosis Date   • Allergic rhinitis    • Carpal tunnel syndrome    • Fibromyalgia            Past Surgical History:  Past Surgical History:   Procedure Laterality Date   • CARPAL TUNNEL RELEASE Right 8/27/2020    Procedure: RIGHT CARPAL TUNNEL RELEASE;  Surgeon: Chidi Gastelum MD;  Location: [facility];  Service: Orthopedics;  Laterality: Right;   • NO PAST SURGERIES             Family History:  Family History   Problem Relation Age of Onset   • Cancer Maternal Grandfather    • Diabetes Maternal Grandfather    • Cancer Paternal Grandmother            Social History:  Social History     Socioeconomic History   • Marital status:      Spouse name: Not on file   • Number of children: Not on file   • Years of education: Not on file   • Highest education level: Not on file   Tobacco Use   • Smoking status: Former Smoker      Packs/day: 1.00     Years: 8.00     Pack years: 8.00     Types: Cigarettes     Last attempt to quit: 2011     Years since quittin.6   • Smokeless tobacco: Never Used   Substance and Sexual Activity   • Alcohol use: No   • Drug use: No   • Sexual activity: Defer     Comment: , 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        Medications:  Current Outpatient Medications   Medication Sig Dispense Refill   • cetirizine (zyrTEC) 10 MG tablet Take 10 mg by mouth Daily.     • clonazePAM (KlonoPIN) 0.5 MG tablet      • cyclobenzaprine (FLEXERIL) 5 MG tablet As Needed.     • fluticasone (FLONASE) 50 MCG/ACT nasal spray 2 sprays into the nostril(s) as directed by provider Daily.     • gabapentin (NEURONTIN) 400 MG capsule Take 400 mg by mouth 3 (Three) Times a Day.     • oxyCODONE-acetaminophen (PERCOCET) 5-325 MG per tablet Take 1 tablet by mouth Every 4 (Four) Hours As Needed (Pain). 16 tablet 0   • traMADol (ULTRAM) 50 MG tablet        No current facility-administered medications for this visit.            Allergies:  No Known Allergies        Review of Systems:   Review of Systems   HENT: Positive for sinus pain.    Eyes: Negative.    Respiratory: Negative.    Cardiovascular: Positive for leg swelling.   Gastrointestinal: Negative.    Endocrine: Negative.    Genitourinary: Negative.    Musculoskeletal: Positive for arthralgias, back pain and joint swelling.   Skin: Negative.    Allergic/Immunologic: Negative.    Neurological: Positive for weakness and numbness.   Hematological: Negative.    Psychiatric/Behavioral: The patient is nervous/anxious.            Physical Exam:   Physical Exam   Constitutional: He is oriented to person, place, and time. No distress.   HENT:   Head: Normocephalic and atraumatic.   Right Ear: External ear normal.   Left Ear: External ear normal.   Eyes: Conjunctivae and EOM are normal. Right eye exhibits no discharge. Left eye exhibits no discharge.   Neck:  "Normal range of motion. Neck supple.   Cardiovascular: Normal rate, regular rhythm and normal heart sounds.   No murmur heard.  Pulmonary/Chest: Effort normal and breath sounds normal. No respiratory distress. He has no wheezes.   Abdominal: Soft. He exhibits no distension. There is no guarding.   Neurological: He is alert and oriented to person, place, and time.   Skin: Skin is warm and dry. Capillary refill takes less than 2 seconds. He is not diaphoretic.   Psychiatric: He has a normal mood and affect. His behavior is normal. Judgment and thought content normal.   Nursing note and vitals reviewed.    GENERAL: 34 y.o. male, alert and oriented X 3 in no acute distress.   Visit Vitals  Temp 97.8 °F (36.6 °C)   Ht 175.3 cm (69\")   Wt 77.1 kg (170 lb)   BMI 25.10 kg/m²         Musculoskeletal:   Examination right hand shows midline incision intact without surrounding erythema with intact sensation to his thumb index and middle fingers.    Left hand evaluation shows diminished sensation thumb index and middle fingers with moderately positive Phalen sign.  Normal radial pulse.      Radiology/Labs:    Previous EMG nerve conduction studies by report describe moderate carpal tunnel syndrome bilaterally right greater than left.    Assessment & Plan:   34 y.o. male presents in follow-up carpal tunnel release right hand doing well he wishes to schedule carpal tunnel release left hand so that he can recover from both injuries without missing too much work.  Today sutures are removed from his right hand we will make tentative plans for carpal tunnel release left hand September 17 of 2020.      ICD-10-CM ICD-9-CM   1. Carpal tunnel syndrome, left G56.02 354.0   2. Preoperative testing Z01.818 V72.84           Cc:   Zeny Ritchie, APRN              This document has been electronically signed by Chidi Gastelum MD   September 4, 2020 16:52                "

## 2020-09-03 PROBLEM — G56.02 CARPAL TUNNEL SYNDROME, LEFT: Status: ACTIVE | Noted: 2020-09-03

## 2020-09-11 DIAGNOSIS — Z01.818 PREOP EXAMINATION: Primary | ICD-10-CM

## 2020-09-15 ENCOUNTER — LAB (OUTPATIENT)
Dept: LAB | Facility: HOSPITAL | Age: 35
End: 2020-09-15

## 2020-09-15 DIAGNOSIS — G56.02 CARPAL TUNNEL SYNDROME, LEFT: ICD-10-CM

## 2020-09-15 DIAGNOSIS — Z01.818 PREOPERATIVE TESTING: ICD-10-CM

## 2020-09-15 PROCEDURE — U0004 COV-19 TEST NON-CDC HGH THRU: HCPCS

## 2020-09-15 PROCEDURE — C9803 HOPD COVID-19 SPEC COLLECT: HCPCS

## 2020-09-16 LAB — SARS-COV-2 RNA NOSE QL NAA+PROBE: NOT DETECTED

## 2020-09-17 ENCOUNTER — ANESTHESIA (OUTPATIENT)
Dept: PERIOP | Facility: HOSPITAL | Age: 35
End: 2020-09-17

## 2020-09-17 ENCOUNTER — ANESTHESIA EVENT (OUTPATIENT)
Dept: PERIOP | Facility: HOSPITAL | Age: 35
End: 2020-09-17

## 2020-09-17 ENCOUNTER — HOSPITAL ENCOUNTER (OUTPATIENT)
Facility: HOSPITAL | Age: 35
Setting detail: HOSPITAL OUTPATIENT SURGERY
Discharge: HOME | End: 2020-09-17
Attending: ORTHOPAEDIC SURGERY | Admitting: ORTHOPAEDIC SURGERY

## 2020-09-17 VITALS
HEART RATE: 72 BPM | OXYGEN SATURATION: 100 % | WEIGHT: 163 LBS | DIASTOLIC BLOOD PRESSURE: 64 MMHG | TEMPERATURE: 97.6 F | RESPIRATION RATE: 18 BRPM | SYSTOLIC BLOOD PRESSURE: 108 MMHG | BODY MASS INDEX: 24.14 KG/M2 | HEIGHT: 69 IN

## 2020-09-17 DIAGNOSIS — G56.02 CARPAL TUNNEL SYNDROME, LEFT: ICD-10-CM

## 2020-09-17 PROCEDURE — 25010000002 PROPOFOL 10 MG/ML EMULSION: Performed by: NURSE ANESTHETIST, CERTIFIED REGISTERED

## 2020-09-17 PROCEDURE — 64721 CARPAL TUNNEL SURGERY: CPT | Performed by: ORTHOPAEDIC SURGERY

## 2020-09-17 PROCEDURE — 25010000002 FENTANYL CITRATE (PF) 100 MCG/2ML SOLUTION: Performed by: NURSE ANESTHETIST, CERTIFIED REGISTERED

## 2020-09-17 PROCEDURE — 25010000002 MIDAZOLAM PER 1 MG: Performed by: NURSE ANESTHETIST, CERTIFIED REGISTERED

## 2020-09-17 PROCEDURE — 25010000003 LIDOCAINE 1 % SOLUTION: Performed by: ORTHOPAEDIC SURGERY

## 2020-09-17 RX ORDER — OXYCODONE HYDROCHLORIDE AND ACETAMINOPHEN 5; 325 MG/1; MG/1
1 TABLET ORAL EVERY 4 HOURS PRN
Qty: 16 TABLET | Refills: 0 | Status: SHIPPED | OUTPATIENT
Start: 2020-09-17 | End: 2020-09-30

## 2020-09-17 RX ORDER — SODIUM CHLORIDE, SODIUM LACTATE, POTASSIUM CHLORIDE, CALCIUM CHLORIDE 600; 310; 30; 20 MG/100ML; MG/100ML; MG/100ML; MG/100ML
125 INJECTION, SOLUTION INTRAVENOUS CONTINUOUS
Status: DISCONTINUED | OUTPATIENT
Start: 2020-09-17 | End: 2020-09-17 | Stop reason: HOSPADM

## 2020-09-17 RX ORDER — MIDAZOLAM HYDROCHLORIDE 1 MG/ML
INJECTION INTRAMUSCULAR; INTRAVENOUS AS NEEDED
Status: DISCONTINUED | OUTPATIENT
Start: 2020-09-17 | End: 2020-09-17 | Stop reason: SURG

## 2020-09-17 RX ORDER — SODIUM CHLORIDE 0.9 % (FLUSH) 0.9 %
10 SYRINGE (ML) INJECTION EVERY 12 HOURS SCHEDULED
Status: DISCONTINUED | OUTPATIENT
Start: 2020-09-17 | End: 2020-09-17 | Stop reason: HOSPADM

## 2020-09-17 RX ORDER — OXYCODONE HYDROCHLORIDE AND ACETAMINOPHEN 5; 325 MG/1; MG/1
1 TABLET ORAL ONCE AS NEEDED
Status: DISCONTINUED | OUTPATIENT
Start: 2020-09-17 | End: 2020-09-17 | Stop reason: HOSPADM

## 2020-09-17 RX ORDER — MAGNESIUM HYDROXIDE 1200 MG/15ML
LIQUID ORAL AS NEEDED
Status: DISCONTINUED | OUTPATIENT
Start: 2020-09-17 | End: 2020-09-17 | Stop reason: HOSPADM

## 2020-09-17 RX ORDER — BUPIVACAINE HYDROCHLORIDE 5 MG/ML
INJECTION, SOLUTION PERINEURAL AS NEEDED
Status: DISCONTINUED | OUTPATIENT
Start: 2020-09-17 | End: 2020-09-17 | Stop reason: HOSPADM

## 2020-09-17 RX ORDER — FENTANYL CITRATE 50 UG/ML
50 INJECTION, SOLUTION INTRAMUSCULAR; INTRAVENOUS
Status: DISCONTINUED | OUTPATIENT
Start: 2020-09-17 | End: 2020-09-17 | Stop reason: HOSPADM

## 2020-09-17 RX ORDER — FENTANYL CITRATE 50 UG/ML
INJECTION, SOLUTION INTRAMUSCULAR; INTRAVENOUS AS NEEDED
Status: DISCONTINUED | OUTPATIENT
Start: 2020-09-17 | End: 2020-09-17 | Stop reason: SURG

## 2020-09-17 RX ORDER — MEPERIDINE HYDROCHLORIDE 25 MG/ML
12.5 INJECTION INTRAMUSCULAR; INTRAVENOUS; SUBCUTANEOUS
Status: DISCONTINUED | OUTPATIENT
Start: 2020-09-17 | End: 2020-09-17 | Stop reason: HOSPADM

## 2020-09-17 RX ORDER — PROPOFOL 10 MG/ML
VIAL (ML) INTRAVENOUS CONTINUOUS PRN
Status: DISCONTINUED | OUTPATIENT
Start: 2020-09-17 | End: 2020-09-17 | Stop reason: SURG

## 2020-09-17 RX ORDER — IPRATROPIUM BROMIDE AND ALBUTEROL SULFATE 2.5; .5 MG/3ML; MG/3ML
3 SOLUTION RESPIRATORY (INHALATION) ONCE AS NEEDED
Status: DISCONTINUED | OUTPATIENT
Start: 2020-09-17 | End: 2020-09-17 | Stop reason: HOSPADM

## 2020-09-17 RX ORDER — SODIUM CHLORIDE 0.9 % (FLUSH) 0.9 %
10 SYRINGE (ML) INJECTION AS NEEDED
Status: DISCONTINUED | OUTPATIENT
Start: 2020-09-17 | End: 2020-09-17 | Stop reason: HOSPADM

## 2020-09-17 RX ORDER — ONDANSETRON 2 MG/ML
4 INJECTION INTRAMUSCULAR; INTRAVENOUS AS NEEDED
Status: DISCONTINUED | OUTPATIENT
Start: 2020-09-17 | End: 2020-09-17 | Stop reason: HOSPADM

## 2020-09-17 RX ORDER — MIDAZOLAM HYDROCHLORIDE 1 MG/ML
1 INJECTION INTRAMUSCULAR; INTRAVENOUS
Status: DISCONTINUED | OUTPATIENT
Start: 2020-09-17 | End: 2020-09-17 | Stop reason: HOSPADM

## 2020-09-17 RX ORDER — LIDOCAINE HYDROCHLORIDE 10 MG/ML
INJECTION, SOLUTION INFILTRATION; PERINEURAL AS NEEDED
Status: DISCONTINUED | OUTPATIENT
Start: 2020-09-17 | End: 2020-09-17 | Stop reason: HOSPADM

## 2020-09-17 RX ADMIN — PROPOFOL 100 MCG/KG/MIN: 10 INJECTION, EMULSION INTRAVENOUS at 10:46

## 2020-09-17 RX ADMIN — MIDAZOLAM HYDROCHLORIDE 2 MG: 1 INJECTION, SOLUTION INTRAMUSCULAR; INTRAVENOUS at 10:41

## 2020-09-17 RX ADMIN — SODIUM CHLORIDE, POTASSIUM CHLORIDE, SODIUM LACTATE AND CALCIUM CHLORIDE: 600; 310; 30; 20 INJECTION, SOLUTION INTRAVENOUS at 10:41

## 2020-09-17 RX ADMIN — FENTANYL CITRATE 100 MCG: 50 INJECTION INTRAMUSCULAR; INTRAVENOUS at 10:41

## 2020-09-17 RX ADMIN — SODIUM CHLORIDE, POTASSIUM CHLORIDE, SODIUM LACTATE AND CALCIUM CHLORIDE 125 ML/HR: 600; 310; 30; 20 INJECTION, SOLUTION INTRAVENOUS at 10:07

## 2020-09-17 NOTE — OP NOTE
CARPAL TUNNEL RELEASE  Procedure Note    Cliff Marino  9/17/2020    Pre-op Diagnosis:   Carpal tunnel syndrome, left [G56.02]    Post-op Diagnosis:     Post-Op Diagnosis Codes:     * Carpal tunnel syndrome, left [G56.02]    Procedure(s):  LEFT CARPAL TUNNEL RELEASE    Surgeon(s):  Chidi Gastelum MD    Anesthesia: General/local    Operative technique: With patient in the operating theatre under general anesthesia left hand and arm sterilely prepped and draped with a tourniquet applied the extremity was exsanguinated and the tourniquet inflated to 200 mmHg.  Palmar aspect left hand infiltrated with 6 cc 0.5 Marcaine.  Longitudinal incision made from the distal wrist crease distally 5 cm in length carried through skin sharply palmar fascia divided exposing underlying transverse carpal ligament which was divided from distal to proximal.  Underlying median nerve was protected.  Tourniquet was then deflated median nerve noted to be moderately hyperemic.  Hemostasis obtained with electrocautery the wound closed in a single layer 3-0 nylon vertical mattress suture sterile dressing applied patient taken to recovery room in stable condition.    Staff:   Circulator: Apollo Jacob RN  Scrub Person: Deena Guajardo  Assistant: Leonel Osorio    Estimated Blood Loss: none    Specimens:   none               Implants/Grafts: none      Drains: None    Complications: none    Tourniquet time: 6 min    Chidi Gastelum MD     Date: 9/17/2020  Time: 11:22 EDT    Cc: Zeny Ritchie, APRN

## 2020-09-17 NOTE — ANESTHESIA POSTPROCEDURE EVALUATION
Patient: Cliff Marino    Procedure Summary     Date: 09/17/20 Room / Location: [facility] OR 03 /  [facility] OR    Anesthesia Start: 1042 Anesthesia Stop: 1124    Procedure: LEFT CARPAL TUNNEL RELEASE (Left Wrist) Diagnosis:       Carpal tunnel syndrome, left      (Carpal tunnel syndrome, left [G56.02])    Surgeon: Chidi Gastelum MD Provider: Michel Wilburn DO    Anesthesia Type: MAC, general ASA Status: 2          Anesthesia Type: MAC, general    Vitals  Vitals Value Taken Time   /70 09/17/20 1140   Temp 97.2 °F (36.2 °C) 09/17/20 1125   Pulse 76 09/17/20 1140   Resp 16 09/17/20 1140   SpO2 100 % 09/17/20 1140           Post Anesthesia Care and Evaluation    Patient location during evaluation: PHASE II  Patient participation: complete - patient participated  Level of consciousness: awake and alert  Pain score: 1  Pain management: adequate  Airway patency: patent  Anesthetic complications: No anesthetic complications  PONV Status: controlled  Cardiovascular status: acceptable  Respiratory status: acceptable and room air  Hydration status: euvolemic  No anesthesia care post op

## 2020-09-17 NOTE — ANESTHESIA PREPROCEDURE EVALUATION
Anesthesia Evaluation     Patient summary reviewed and Nursing notes reviewed   no history of anesthetic complications:  NPO Solid Status: > 8 hours  NPO Liquid Status: > 8 hours           Airway   Mallampati: II  TM distance: >3 FB  Neck ROM: full  No difficulty expected  Dental - normal exam   (+) partials and upper dentures        Pulmonary - normal exam   (+) a smoker Former,   Cardiovascular - negative cardio ROS and normal exam  Exercise tolerance: good (4-7 METS)    NYHA Classification: II  Rhythm: regular  Rate: normal        Neuro/Psych  (+) neuromuscular disease (fibromyalgia), numbness,     GI/Hepatic/Renal/Endo - negative ROS     Musculoskeletal     (+) myalgias,   Abdominal  - normal exam   Substance History - negative use     OB/GYN negative ob/gyn ROS         Other - negative ROS                         Anesthesia Plan    ASA 2     MAC and general   total IV anesthesia  intravenous induction     Anesthetic plan, all risks, benefits, and alternatives have been provided, discussed and informed consent has been obtained with: patient.    Plan discussed with CRNA.

## 2020-09-30 ENCOUNTER — OFFICE VISIT (OUTPATIENT)
Dept: ORTHOPEDIC SURGERY | Facility: CLINIC | Age: 35
End: 2020-09-30

## 2020-09-30 VITALS — TEMPERATURE: 97.7 F | BODY MASS INDEX: 24.14 KG/M2 | HEIGHT: 69 IN | WEIGHT: 163 LBS

## 2020-09-30 DIAGNOSIS — Z09 POSTOP CHECK: Primary | ICD-10-CM

## 2020-09-30 PROCEDURE — 99024 POSTOP FOLLOW-UP VISIT: CPT | Performed by: ORTHOPAEDIC SURGERY

## 2021-07-14 ENCOUNTER — TRANSCRIBE ORDERS (OUTPATIENT)
Dept: ADMINISTRATIVE | Facility: HOSPITAL | Age: 36
End: 2021-07-14

## 2021-07-14 ENCOUNTER — HOSPITAL ENCOUNTER (OUTPATIENT)
Dept: GENERAL RADIOLOGY | Facility: HOSPITAL | Age: 36
Discharge: HOME | End: 2021-07-14
Admitting: FAMILY MEDICINE

## 2021-07-14 DIAGNOSIS — M54.9 BACK PAIN, UNSPECIFIED BACK LOCATION, UNSPECIFIED BACK PAIN LATERALITY, UNSPECIFIED CHRONICITY: Primary | ICD-10-CM

## 2021-07-14 DIAGNOSIS — M54.9 BACK PAIN, UNSPECIFIED BACK LOCATION, UNSPECIFIED BACK PAIN LATERALITY, UNSPECIFIED CHRONICITY: ICD-10-CM

## 2021-07-14 DIAGNOSIS — M54.50 LUMBAR PAIN: ICD-10-CM

## 2021-07-14 PROCEDURE — 73564 X-RAY EXAM KNEE 4 OR MORE: CPT | Performed by: RADIOLOGY

## 2021-07-14 PROCEDURE — 73564 X-RAY EXAM KNEE 4 OR MORE: CPT

## 2021-07-14 PROCEDURE — 72100 X-RAY EXAM L-S SPINE 2/3 VWS: CPT | Performed by: RADIOLOGY

## 2021-07-14 PROCEDURE — 72100 X-RAY EXAM L-S SPINE 2/3 VWS: CPT

## 2024-03-18 ENCOUNTER — OFFICE VISIT (OUTPATIENT)
Dept: UROLOGY | Facility: CLINIC | Age: 39
End: 2024-03-18
Payer: COMMERCIAL

## 2024-03-18 VITALS
HEIGHT: 69 IN | HEART RATE: 86 BPM | SYSTOLIC BLOOD PRESSURE: 147 MMHG | DIASTOLIC BLOOD PRESSURE: 91 MMHG | WEIGHT: 157.4 LBS | BODY MASS INDEX: 23.31 KG/M2

## 2024-03-18 DIAGNOSIS — Z30.09 VASECTOMY EVALUATION: Primary | ICD-10-CM

## 2024-03-18 PROCEDURE — 99203 OFFICE O/P NEW LOW 30 MIN: CPT

## 2024-03-18 RX ORDER — CEPHALEXIN 500 MG/1
500 CAPSULE ORAL 2 TIMES DAILY
Qty: 8 CAPSULE | Refills: 0 | Status: SHIPPED | OUTPATIENT
Start: 2024-03-18 | End: 2024-03-22

## 2024-03-18 RX ORDER — DIAZEPAM 10 MG/1
TABLET ORAL
Qty: 2 TABLET | Refills: 0 | Status: SHIPPED | OUTPATIENT
Start: 2024-03-18

## 2024-03-18 RX ORDER — IBUPROFEN 800 MG/1
800 TABLET ORAL
COMMUNITY
Start: 2023-11-06

## 2024-03-18 RX ORDER — SULINDAC 200 MG/1
200 TABLET ORAL
COMMUNITY
Start: 2023-10-11

## 2024-03-18 RX ORDER — CITALOPRAM 20 MG/1
TABLET ORAL
COMMUNITY
Start: 2024-02-07

## 2024-03-18 RX ORDER — DEXTROAMPHETAMINE SACCHARATE, AMPHETAMINE ASPARTATE, DEXTROAMPHETAMINE SULFATE AND AMPHETAMINE SULFATE 1.25; 1.25; 1.25; 1.25 MG/1; MG/1; MG/1; MG/1
TABLET ORAL
COMMUNITY
Start: 2024-03-13

## 2024-03-18 NOTE — PROGRESS NOTES
"Chief Complaint:    Chief Complaint   Patient presents with    Sterilization       Vital Signs:   /91 (BP Location: Left arm, Patient Position: Sitting, Cuff Size: Adult)   Pulse 86   Ht 175.3 cm (69.02\")   Wt 71.4 kg (157 lb 6.4 oz)   BMI 23.23 kg/m²   Body mass index is 23.23 kg/m².      HPI:  Cliff Marino is a 38 y.o. male who presents today for initial evaluation     History of Present Illness  Mr. Marino presents to the clinic for evaluation of elective scrotal vasectomy.  He has fathered 2 children in the past.  Both he and his wife are in agreements to undergo an elective scrotal vasectomy.  He denies any acute complaints at this time.  Denies any previous urogenital procedures.  Denies any scrotal pain, testicular growths, scrotal swelling, hematospermia, hematuria, or lower urinary tract symptoms.  Physical exam today is unremarkable.  Will get him scheduled in office with Dr. Orozco.      Past Medical History:  Past Medical History:   Diagnosis Date    Allergic rhinitis     Carpal tunnel syndrome     Fibromyalgia        Current Meds:  Current Outpatient Medications   Medication Sig Dispense Refill    amphetamine-dextroamphetamine (ADDERALL) 5 MG tablet       cetirizine (zyrTEC) 10 MG tablet Take 1 tablet by mouth Daily.      citalopram (CeleXA) 20 MG tablet       clonazePAM (KlonoPIN) 0.5 MG tablet       cyclobenzaprine (FLEXERIL) 5 MG tablet As Needed.      fluticasone (FLONASE) 50 MCG/ACT nasal spray 2 sprays into the nostril(s) as directed by provider Daily.      gabapentin (NEURONTIN) 400 MG capsule Take 1 capsule by mouth 3 (Three) Times a Day.      ibuprofen (ADVIL,MOTRIN) 800 MG tablet Take 1 tablet by mouth.      sulindac (CLINORIL) 200 MG tablet Take 1 tablet by mouth.      traMADol (ULTRAM) 50 MG tablet       cephalexin (KEFLEX) 500 MG capsule Take 1 capsule by mouth 2 (Two) Times a Day for 4 days. Start medication day before procedure 8 capsule 0    diazePAM (VALIUM) 10 MG tablet Use " one tablet night before procedure at bedtime and morning of one hour prior to procedure 2 tablet 0     No current facility-administered medications for this visit.        Allergies:   No Known Allergies     Past Surgical History:  Past Surgical History:   Procedure Laterality Date    CARPAL TUNNEL RELEASE Right 2020    Procedure: RIGHT CARPAL TUNNEL RELEASE;  Surgeon: Chidi Gastelum MD;  Location: [facility];  Service: Orthopedics;  Laterality: Right;    CARPAL TUNNEL RELEASE Left 2020    Procedure: LEFT CARPAL TUNNEL RELEASE;  Surgeon: Chidi Gastelum MD;  Location: [facility];  Service: Orthopedics;  Laterality: Left;    NO PAST SURGERIES         Social History:  Social History     Socioeconomic History    Marital status:    Tobacco Use    Smoking status: Former     Current packs/day: 0.00     Average packs/day: 1 pack/day for 8.0 years (8.0 ttl pk-yrs)     Types: Cigarettes     Start date: 2003     Quit date: 2011     Years since quittin.2     Passive exposure: Past    Smokeless tobacco: Never   Vaping Use    Vaping status: Never Used   Substance and Sexual Activity    Alcohol use: No    Drug use: No    Sexual activity: Defer     Comment: , 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   Respiratory:  Negative for chest tightness and shortness of breath.    Cardiovascular:  Negative for chest pain.   Gastrointestinal:  Negative for abdominal pain, constipation, diarrhea, nausea and vomiting.   Genitourinary:  Negative for difficulty urinating, dysuria, frequency and urgency.   Skin:  Negative for rash.   Psychiatric/Behavioral:  Negative for confusion and suicidal ideas.        Physical  Exam:  Physical Exam  Constitutional:       General: He is not in acute distress.     Appearance: Normal appearance.   HENT:      Head: Normocephalic and atraumatic.      Nose: Nose normal.      Mouth/Throat:      Mouth: Mucous membranes are moist.   Eyes:      Conjunctiva/sclera: Conjunctivae normal.   Cardiovascular:      Rate and Rhythm: Normal rate and regular rhythm.      Pulses: Normal pulses.      Heart sounds: Normal heart sounds.   Pulmonary:      Effort: Pulmonary effort is normal.      Breath sounds: Normal breath sounds.   Abdominal:      General: Bowel sounds are normal.      Palpations: Abdomen is soft.   Genitourinary:     Penis: Normal.       Testes: Normal.   Musculoskeletal:         General: Normal range of motion.      Cervical back: Normal range of motion.   Skin:     General: Skin is warm.   Neurological:      General: No focal deficit present.      Mental Status: He is alert and oriented to person, place, and time.   Psychiatric:         Mood and Affect: Mood normal.         Behavior: Behavior normal.         Thought Content: Thought content normal.         Judgment: Judgment normal.         Recent Image (CT and/or KUB):   CT Abdomen and Pelvis: No results found for this or any previous visit.     CT Stone Protocol: No results found for this or any previous visit.     KUB: No results found for this or any previous visit.       Labs:  Brief Urine Lab Results       None          No visits with results within 3 Month(s) from this visit.   Latest known visit with results is:   Lab on 09/15/2020   Component Date Value Ref Range Status    SARS-CoV-2 SYMONE 09/16/2020 Not Detected  Not Detected Final        Procedure: None  Procedures     I have reviewed and agree with the above PMH, PSH, FMH, social history, medications, allergies, and labs.     Assessment/Plan:   Problem List Items Addressed This Visit          Genitourinary and Reproductive     Vasectomy evaluation - Primary    Relevant Medications     cephalexin (KEFLEX) 500 MG capsule    diazePAM (VALIUM) 10 MG tablet       Health Maintenance:   Health Maintenance Due   Topic Date Due    TDAP/TD VACCINES (1 - Tdap) Never done    HEPATITIS C SCREENING  Never done    ANNUAL PHYSICAL  Never done    INFLUENZA VACCINE  Never done    COVID-19 Vaccine (3 - 2023-24 season) 09/01/2023        Smoking Counseling: Former smoker. Never used smokeless tobacco. Counseling given.     Urine Incontinence: Patient reports that he is not currently experiencing any symptoms of urinary incontinence.    Patient was given instructions and counseling regarding his condition or for health maintenance advice. Please see specific information pulled into the AVS if appropriate.    Patient Education:   Mr. Marino presents for consideration of an elective scrotal vasectomy.  I discussed the procedure at length including the risks of local anesthesia, bleeding, infection, testicular pain postoperatively, and a very low chance of long-term testicular pain.  He was informed of the rates of surgical complications such as a symptomatic hematoma and infection in the range of 1 to 2% and clinically from the risk of chronic significant scrotal pain in the range of 1 to 2%.  The patient was informed of a possible failure rate in the range of 1 in 10,000 and the important necessity to have a follow-up in about 8 weeks after the original procedure to be sure that the semen specimen is free of spermatozoa, thus ensuring sterility.  I discussed the various techniques out there including a 1 incision, 2 incision technique as well.  They understand that we will be giving local anesthesia and a mild antibiotic to take in the immediate perioperative period.  If he cannot tolerate the anesthesia for a variety of reasons including body habitus., we are glad to perform it under an anesthetic.  We discussed the technique of reversal when indicated including the approximate rate of 57% and the importance that this  is strongly related to the time from the original vasectomy.  However, I stressed the fact that if they are even considering children in the future they should not use this as a form of temporary contraception. Patients can stop contraception once postvasectomy semen specimens show azoospermia or only rare nonmotile spermatozoa in the range of less than 100,000 sperm per mLnts.  Physical exam today was unremarkable.  Will get him scheduled in office with Dr. Orozco.  Advised patient to begin cephalexin twice daily 1 day prior to procedure for infection prophylaxis.  I also send in Valium for him to use for preop use.  Discussed the risk and benefits of both these medications in detail.  Otherwise he will follow-up in office for vasectomy.    Visit Diagnoses:    ICD-10-CM ICD-9-CM   1. Vasectomy evaluation  Z30.09 V25.09       Meds Ordered During Visit:  New Medications Ordered This Visit   Medications    cephalexin (KEFLEX) 500 MG capsule     Sig: Take 1 capsule by mouth 2 (Two) Times a Day for 4 days. Start medication day before procedure     Dispense:  8 capsule     Refill:  0    diazePAM (VALIUM) 10 MG tablet     Sig: Use one tablet night before procedure at bedtime and morning of one hour prior to procedure     Dispense:  2 tablet     Refill:  0       Follow Up Appointment: In office elective scrotal vasectomy  No follow-ups on file.      This document has been electronically signed by Aaron Mcknight PA-C   March 18, 2024 15:46 EDT    Part of this note may be an electronic transcription/translation of spoken language to printed text using the Dragon Dictation System.

## 2024-03-22 ENCOUNTER — PATIENT ROUNDING (BHMG ONLY) (OUTPATIENT)
Dept: UROLOGY | Facility: CLINIC | Age: 39
End: 2024-03-22
Payer: COMMERCIAL

## 2024-07-11 ENCOUNTER — PROCEDURE VISIT (OUTPATIENT)
Dept: UROLOGY | Facility: CLINIC | Age: 39
End: 2024-07-11
Payer: COMMERCIAL

## 2024-07-11 VITALS
HEART RATE: 80 BPM | BODY MASS INDEX: 22.9 KG/M2 | DIASTOLIC BLOOD PRESSURE: 78 MMHG | WEIGHT: 154.6 LBS | SYSTOLIC BLOOD PRESSURE: 125 MMHG | HEIGHT: 69 IN

## 2024-07-11 DIAGNOSIS — Z30.09 VASECTOMY EVALUATION: Primary | ICD-10-CM

## 2024-07-11 RX ORDER — HYDROCODONE BITARTRATE AND ACETAMINOPHEN 10; 325 MG/1; MG/1
1 TABLET ORAL EVERY 6 HOURS PRN
Qty: 10 TABLET | Refills: 0 | Status: SHIPPED | OUTPATIENT
Start: 2024-07-11

## 2024-07-11 NOTE — PROGRESS NOTES
Chief Complaint:      Chief Complaint   Patient presents with    Sterilization       HPI:   38 y.o. male vasectomy status-patient presents for consideration of an elective scrotal vasectomy.  I discussed the procedure at length.  I discussed the fact that it has risks of local anesthesia, bleeding, infection, testicular pain postoperatively shortly, and a very low chance of long-term testicular pain.  Discussed the rates of surgical complications such as a symptomatic hematoma and infection in the range of 1 to 2% and clinically from the risk of chronic significant scrotal pain in the range of 1 to 2%.  Discussed the failure rate in the range of 1 in 10,000 and the important necessity to have a follow-up in about 8 weeks after the original procedure to be sure that the semen specimen is free of spermatozoa, thus ensuring sterility.  I discussed the various techniques out there including a 1 incision, 2 incision technique as well.  They understand that we will be giving local anesthesia with Valium the night before and the morning of and a mild antibiotic to take in the immediate perioperative period.  If he cannot tolerate the anesthesia for a variety of reasons including body habitus., we are glad to perform it under an anesthetic.  We discussed the technique of reversal when indicated including the approximate rate of 57% and the importance that this is strongly related to the time from the original vasectomy.  However, I stressed the fact that if they are even considering children in the future they should not use this as a form of temporary contraception patients can stop using contraception 1 postvasectomy semen specimens show azoospermia or only rare nonmotile spermatozoa in the range of less than 100,000 sperm per mL      Past Medical History:     Past Medical History:   Diagnosis Date    Allergic rhinitis     Carpal tunnel syndrome     Fibromyalgia        Current Meds:     Current Outpatient Medications    Medication Sig Dispense Refill    amphetamine-dextroamphetamine (ADDERALL) 5 MG tablet       cetirizine (zyrTEC) 10 MG tablet Take 1 tablet by mouth Daily.      citalopram (CeleXA) 20 MG tablet       clonazePAM (KlonoPIN) 0.5 MG tablet       cyclobenzaprine (FLEXERIL) 5 MG tablet As Needed.      diazePAM (VALIUM) 10 MG tablet Use one tablet night before procedure at bedtime and morning of one hour prior to procedure 2 tablet 0    fluticasone (FLONASE) 50 MCG/ACT nasal spray 2 sprays into the nostril(s) as directed by provider Daily.      gabapentin (NEURONTIN) 400 MG capsule Take 1 capsule by mouth 3 (Three) Times a Day.      ibuprofen (ADVIL,MOTRIN) 800 MG tablet Take 1 tablet by mouth.      sulindac (CLINORIL) 200 MG tablet Take 1 tablet by mouth.      traMADol (ULTRAM) 50 MG tablet        No current facility-administered medications for this visit.        Allergies:      No Known Allergies     Past Surgical History:     Past Surgical History:   Procedure Laterality Date    CARPAL TUNNEL RELEASE Right 2020    Procedure: RIGHT CARPAL TUNNEL RELEASE;  Surgeon: Chidi Gastelum MD;  Location: [facility];  Service: Orthopedics;  Laterality: Right;    CARPAL TUNNEL RELEASE Left 2020    Procedure: LEFT CARPAL TUNNEL RELEASE;  Surgeon: Chidi Gastelum MD;  Location: [facility];  Service: Orthopedics;  Laterality: Left;    NO PAST SURGERIES         Social History:     Social History     Socioeconomic History    Marital status:    Tobacco Use    Smoking status: Former     Current packs/day: 0.00     Average packs/day: 1 pack/day for 8.0 years (8.0 ttl pk-yrs)     Types: Cigarettes     Start date: 2003     Quit date: 2011     Years since quittin.5     Passive exposure: Past    Smokeless tobacco: Never   Vaping Use    Vaping status: Never Used   Substance and Sexual Activity    Alcohol use: No    Drug use: No    Sexual activity: Yes     Partners: Female     Birth  control/protection: Condom     Comment: , 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    HENT: Negative.     Eyes: Negative.    Respiratory: Negative.     Cardiovascular: Negative.    Gastrointestinal: Negative.    Endocrine: Negative.    Musculoskeletal: Negative.    Allergic/Immunologic: Negative.    Neurological: Negative.    Hematological: Negative.    Psychiatric/Behavioral: Negative.         Physical Exam:     Physical Exam  Vitals and nursing note reviewed.   Constitutional:       Appearance: He is well-developed.   HENT:      Head: Normocephalic and atraumatic.   Eyes:      Conjunctiva/sclera: Conjunctivae normal.      Pupils: Pupils are equal, round, and reactive to light.   Cardiovascular:      Rate and Rhythm: Normal rate and regular rhythm.      Heart sounds: Normal heart sounds.   Pulmonary:      Effort: Pulmonary effort is normal.      Breath sounds: Normal breath sounds.   Abdominal:      General: Bowel sounds are normal.      Palpations: Abdomen is soft.   Genitourinary:     Penis: Normal.       Testes: Normal.   Musculoskeletal:         General: Normal range of motion.      Cervical back: Normal range of motion.   Skin:     General: Skin is warm and dry.   Neurological:      Mental Status: He is alert and oriented to person, place, and time.      Deep Tendon Reflexes: Reflexes are normal and symmetric.   Psychiatric:         Behavior: Behavior normal.         Thought Content: Thought content normal.         Judgment: Judgment normal.         I have reviewed the following portions of the patient's history: Allergies, current medications, past family history, past medical history, past social history, past surgical history, problem list, and ROS and confirm it is  accurate.    Recent Image (CT and/or KUB):      CT Abdomen and Pelvis: No results found for this or any previous visit.       CT Stone Protocol: No results found for this or any previous visit.       KUB: No results found for this or any previous visit.       Labs (past 3 months):      No visits with results within 3 Month(s) from this visit.   Latest known visit with results is:   Lab on 09/15/2020   Component Date Value Ref Range Status    SARS-CoV-2 SYMONE 09/16/2020 Not Detected  Not Detected Final        Procedure:   Elective scrotal vasectomy -After an appropriate informed consent including the risks of anesthesia, infection, scrotal hematoma, failure in the range of 1 in 10,000, chronic testalgia, low testosterone, as well as the other very rare complications identified.  He was brought to the operative suite.  His scrotum was shaved and prepped in a sterile fashion using Betadine.  Local anesthetic was infiltrated into the midline scrotal raphae.  A midline scrotal incision was made and the right vas entrapped.  I anesthetized the spermatic cord and skin using 5 mL of 1% Xylocaine with epinephrine after an adequate period of analgesia.  I identified the vas and brought out into the incision.  The fascia was divided.  The vas was then doubly ligated, fulgurated, and sewn in the sheath away from the proximal end.  Bovie electrocautery had been used to fulgurate the end of both vasa as per the AUA guidelines.  Hemostasis was excellent and it was allowed to fall back in the scrotal sac.  The identical procedure was done on the contralateral side.  The skin was closed with a 3-0 chromic sutures.  Hemostasis was excellent.  He was recommended to use ice pack with a shield covering the scrotum to protect it from the ice.  He was given pain medication and antibiotics.  The incision was covered with bacitracin ointment.  The patient will be seen in 8 weeks whereupon we will then check a semen analysis to confirm the  absence of spermatozoa and therby declare sterility.    Assessment/Plan:   Vasectomy status-patient presents for consideration of an elective scrotal vasectomy.  I discussed the procedure at length.  I discussed the fact that it has risks of local anesthesia, bleeding, infection, testicular pain postoperatively shortly, and a very low chance of long-term testicular pain.  Discussed the rates of surgical complications such as a symptomatic hematoma and infection in the range of 1 to 2% and clinically from the risk of chronic significant scrotal pain in the range of 1 to 2%.  Discussed the failure rate in the range of 1 in 10,000 and the important necessity to have a follow-up in about 8 weeks after the original procedure to be sure that the semen specimen is free of spermatozoa, thus ensuring sterility.  I discussed the various techniques out there including a 1 incision, 2 incision technique as well.  They understand that we will be giving local anesthesia with Valium the night before and the morning of and a mild antibiotic to take in the immediate perioperative period.  If he cannot tolerate the anesthesia for a variety of reasons including body habitus., we are glad to perform it under an anesthetic.  We discussed the technique of reversal when indicated including the approximate rate of 57% and the importance that this is strongly related to the time from the original vasectomy.  However, I stressed the fact that if they are even considering children in the future they should not use this as a form of temporary contraception patients can stop using contraception 1 postvasectomy semen specimens show azoospermia or only rare nonmotile spermatozoa in the range of less than 100,000 sperm per mL  Narcotic pain medication-patient has significant acute pain that I believe would be an indication for the use of narcotic pain medication.  I discussed the significant risks of pain medication and the fact that this will be  a short only option and I will give her no more than a three-day supply of pain medication, I will not plan long-term medication, and that this will be sent to a pain clinic if it at all becomes necessary.  We discussed signing a pain medication agreement and the fact that we're going to run a state EB review to be sure the patient is not getting pain medication from elsewhere.  If this is the case, we will not give pain medication as part of the patient's treatment plan of there being prescribed a controlled substance with potential for abuse.  This patient has been well aware of the appropriate dose of such medications including the risks for somnolence, limited ability to drive and/or safety and the significant potential for overdose.  It has been made clear that these medications are for the prescribed patient only without concomitant use of alcohol or other substance unless prescribed by the medical provider.  Has completed prescribing agreement detailing the terms of continue prescribing him a controlled substance including monitoring Eb reports, the possibility of urine drug screens, and pill counts.  The patient is aware that we review EB reports on a regular basis and scan them into the chart.  History and physical examination exhibited continued safe and appropriate use of controlled substances. We also discussed the fact that the new [place] legislation allows only a three-day prescription for pain medication.  In this situation he will be referred to a chronic pain clinic.            This document has been electronically signed by ADRIANA JAMIL MD July 11, 2024 08:07 EDT    Dictated Utilizing Dragon Dictation: Part of this note may be an electronic transcription/translation of spoken language to printed text using the Dragon Dictation System.

## 2024-07-11 NOTE — PATIENT INSTRUCTIONS
Vasectomy, Care After  Refer to this sheet in the next few weeks. These instructions provide you with information on caring for yourself after your procedure. Your health care provider may also give you more specific instructions. Your treatment has been planned according to current medical practices, but problems sometimes occur. Call your health care provider if you have any problems or questions after your procedure.    What can I expect after the procedure?  After your procedure, it is typical to have the following:  Slight swelling or redness or both at the surgical site.  Mild pain or discomfort in the scrotum.  Some oozing of blood from the cuts (incisions) made by the surgeon is normal during the first day or two after the procedure.  Blood in the ejaculate is common and typically clears after a few days.    Follow these instructions at home:  Only take over-the-counter or prescription medicines for pain, discomfort, or fever as directed by your health care provider.  Avoid using nonsteroidal anti-inflammatory drugs (NSAIDs) because these can make bleeding worse.  Apply ice to the injured area:  Put ice in a plastic bag.  Place a towel between your skin and the bag.  Leave the ice on for 20 minutes, 2-3 times a day.  Avoid being active for the first 2 days after surgery.  Wear a supporter while moving around for the first week after surgery. You may add some sterile fluffed bandages or a clean washcloth to the scrotal support if the scrotal support irritates your skin.  Do not participate in sports or perform heavy physical labor for at least 2 weeks.  You may have protected intercourse 7-10 days after your procedure. Remember, you are not sterile until follow-up specimens show no sperm in your ejaculate.  Be sure to follow up with your surgeon as instructed to confirm sterility. It usually requires multiple (20 - 30) ejaculations to clear the sperm located beyond the vasectomy site of blockage. You will  need at least two specimens showing an absence of sperm before you can resume unprotected intercourse.    Contact a health care provider if:  You have redness, swelling, or increasing pain in the wounds or testicles (scrotum).  You see pus coming from the wound.  You have a fever.  You notice a foul smell coming from the wound or dressing.  You notice a breaking open of the stitches (suture) line or wound edges even after sutures have been removed.  You have increased bleeding from the wounds.    Get help right away if:  You develop a rash.  You have difficulty breathing.  You have any reaction or side effects to medicines given.               Semen Analysis Lab Instructions - After Vasectomy     Follow these instructions at home:   4-6 weeks after vasectomy or approximately 20 - 30 ejaculations.   Semen specimen must be collected in sterile container provided by our office.  You must have your Name and Date of Birth legibly printed on the sterile container.  Sample must be dropped off at [facility] no more than 30 minutes after sample collected.     This information is not intended to replace advice given to you by your health care provider. Make sure you discuss any questions you have with your health care provider.  Document Released: 07/07/2006 Document Revised: 05/25/2017 Document Reviewed: 07/07/2014  Elsevier Interactive Patient Education © 2018 [employer]

## 2024-10-15 ENCOUNTER — LAB (OUTPATIENT)
Dept: UROLOGY | Facility: CLINIC | Age: 39
End: 2024-10-15
Payer: COMMERCIAL

## 2024-10-15 DIAGNOSIS — Z98.52 STATUS POST VASECTOMY: Primary | ICD-10-CM

## 2024-10-15 LAB — SPERM - POST VASECTOMY: NORMAL

## 2024-10-15 PROCEDURE — 89321 SEMEN ANAL SPERM DETECTION: CPT | Performed by: UROLOGY

## 2024-10-24 ENCOUNTER — OFFICE VISIT (OUTPATIENT)
Dept: UROLOGY | Facility: CLINIC | Age: 39
End: 2024-10-24
Payer: COMMERCIAL

## 2024-10-24 VITALS
HEART RATE: 69 BPM | HEIGHT: 69 IN | BODY MASS INDEX: 23.49 KG/M2 | WEIGHT: 158.6 LBS | DIASTOLIC BLOOD PRESSURE: 86 MMHG | SYSTOLIC BLOOD PRESSURE: 125 MMHG

## 2024-10-24 DIAGNOSIS — Z98.52 STATUS POST VASECTOMY: Primary | ICD-10-CM

## 2024-10-24 NOTE — PROGRESS NOTES
"Chief Complaint:    Chief Complaint   Patient presents with    Sterilization     8 week follow up        Vital Signs:   /86 (BP Location: Right arm, Patient Position: Sitting, Cuff Size: Adult)   Pulse 69   Ht 175.3 cm (69.02\")   Wt 71.9 kg (158 lb 9.6 oz)   BMI 23.41 kg/m²   Body mass index is 23.41 kg/m².      HPI:  Cliff Marino is a 38 y.o. male who presents today for follow up    History of Present Illness  Mr. Marino presents to the clinic for a postop follow-up.  Patient underwent an elective scrotal vasectomy in office by Dr. Orozco roughly 8 weeks ago.  He reports some minor pain postop but is since resolved.  He denies any scrotal swelling, testicular pain, testicular lumps, hematospermia, hematuria, painful ejaculation, or lower urinary tract symptoms.  He did have a semen analysis completed 9 days ago that showed no spermatozoa present.  Physical exam today reveals a well-healed surgical incision with no abnormalities.  He is cleared from urological standpoint      Past Medical History:  Past Medical History:   Diagnosis Date    Allergic rhinitis     Carpal tunnel syndrome     Fibromyalgia        Current Meds:  Current Outpatient Medications   Medication Sig Dispense Refill    cetirizine (zyrTEC) 10 MG tablet Take 1 tablet by mouth Daily.      citalopram (CeleXA) 20 MG tablet       clonazePAM (KlonoPIN) 0.5 MG tablet       fluticasone (FLONASE) 50 MCG/ACT nasal spray Administer 2 sprays into the nostril(s) as directed by provider Daily.      gabapentin (NEURONTIN) 400 MG capsule Take 1 capsule by mouth 3 (Three) Times a Day.      sulindac (CLINORIL) 200 MG tablet Take 1 tablet by mouth.      traMADol (ULTRAM) 50 MG tablet        No current facility-administered medications for this visit.        Allergies:   No Known Allergies     Past Surgical History:  Past Surgical History:   Procedure Laterality Date    CARPAL TUNNEL RELEASE Right 08/27/2020    Procedure: RIGHT CARPAL TUNNEL RELEASE;  " Surgeon: Chidi Gastelum MD;  Location:  [facility];  Service: Orthopedics;  Laterality: Right;    CARPAL TUNNEL RELEASE Left 2020    Procedure: LEFT CARPAL TUNNEL RELEASE;  Surgeon: Chidi Gastelum MD;  Location:  [facility];  Service: Orthopedics;  Laterality: Left;    NO PAST SURGERIES         Social History:  Social History     Socioeconomic History    Marital status:    Tobacco Use    Smoking status: Former     Current packs/day: 0.00     Average packs/day: 1 pack/day for 8.0 years (8.0 ttl pk-yrs)     Types: Cigarettes     Start date: 2003     Quit date: 2011     Years since quittin.8     Passive exposure: Past    Smokeless tobacco: Never   Vaping Use    Vaping status: Never Used   Substance and Sexual Activity    Alcohol use: No    Drug use: No    Sexual activity: Yes     Partners: Female     Birth control/protection: Condom     Comment: , 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 Negative for chills, fever and unexpected weight change.   HENT:  Positive for sinus pressure. Negative for congestion.    Respiratory:  Negative for chest tightness and shortness of breath.    Cardiovascular:  Negative for chest pain.   Gastrointestinal:  Negative for abdominal pain, constipation, diarrhea, nausea and vomiting.   Genitourinary:  Negative for difficulty urinating, dysuria, flank pain, frequency, hematuria, penile swelling, scrotal swelling, testicular pain and urgency.   Musculoskeletal:  Negative for back pain and neck pain.   Skin:  Negative for rash.   Allergic/Immunologic: Negative for food allergies.   Neurological:  Positive for headaches. Negative for dizziness.   Hematological:  Does not bruise/bleed easily.    Psychiatric/Behavioral:  Negative for confusion and suicidal ideas. The patient is nervous/anxious.        Physical Exam:  Physical Exam  Constitutional:       General: He is not in acute distress.     Appearance: Normal appearance.   HENT:      Head: Normocephalic and atraumatic.      Nose: Nose normal.      Mouth/Throat:      Mouth: Mucous membranes are moist.   Eyes:      Conjunctiva/sclera: Conjunctivae normal.   Cardiovascular:      Rate and Rhythm: Normal rate and regular rhythm.      Pulses: Normal pulses.      Heart sounds: Normal heart sounds.   Pulmonary:      Effort: Pulmonary effort is normal.      Breath sounds: Normal breath sounds.   Abdominal:      General: Bowel sounds are normal.      Palpations: Abdomen is soft.   Genitourinary:     Penis: Normal.       Testes: Normal.      Comments: Well-healed surgical incision with no abnormalities.  Musculoskeletal:         General: Normal range of motion.      Cervical back: Normal range of motion.   Skin:     General: Skin is warm.   Neurological:      General: No focal deficit present.      Mental Status: He is alert and oriented to person, place, and time.   Psychiatric:         Mood and Affect: Mood normal.         Behavior: Behavior normal.         Thought Content: Thought content normal.         Judgment: Judgment normal.         Recent Image (CT and/or KUB):   CT Abdomen and Pelvis: No results found for this or any previous visit.     CT Stone Protocol: No results found for this or any previous visit.     KUB: No results found for this or any previous visit.       Labs:  Brief Urine Lab Results       None          Lab on 10/15/2024   Component Date Value Ref Range Status    Sperm - Post Vasectomy 10/15/2024 No sperm seen  No sperm seen Final        Procedure: None  Procedures     I have reviewed and agree with the above PMH, PSH, FMH, social history, medications, allergies, and labs.     Assessment/Plan:   Problem List Items Addressed This Visit     None      Health Maintenance:   Health Maintenance Due   Topic Date Due    TDAP/TD VACCINES (1 - Tdap) Never done    HEPATITIS C SCREENING  Never done    ANNUAL PHYSICAL  Never done    INFLUENZA VACCINE  Never done    COVID-19 Vaccine (3 - 2023-24 season) 09/01/2024        Smoking Counseling: Former smoker.  Never used smokeless tobacco.    Urine Incontinence: Patient reports that he is not currently experiencing any symptoms of urinary incontinence.    Patient was given instructions and counseling regarding his condition or for health maintenance advice. Please see specific information pulled into the AVS if appropriate.    Patient Education:   Status post vasectomy -patient is roughly 8 weeks status post vasectomy and is healed well.  Semen analysis was completely unremarkable and he has no complaints.  He is cleared from urological standpoint and is no longer require contraceptive methods.  Educated to return to the clinic if needed otherwise we will see him back on an as-needed basis.  He verbalized understanding.    Visit Diagnoses:  No diagnosis found.    Meds Ordered During Visit:  No orders of the defined types were placed in this encounter.      Follow Up Appointment: As needed  No follow-ups on file.      This document has been electronically signed by Aaron Mcknight PA-C   October 24, 2024 16:30 EDT    Part of this note may be an electronic transcription/translation of spoken language to printed text using the Dragon Dictation System.

## (undated) DEVICE — APPL CHLORAPREP HI/LITE 26ML ORNG

## (undated) DEVICE — DRSNG WND GZ CURAD OIL EMULSION 3X3IN STRL

## (undated) DEVICE — CUFF TOURNI 1BLADDER 1PRT 18IN STRL

## (undated) DEVICE — PAD GRND REM POLYHESIVE A/ DISP

## (undated) DEVICE — GLV SURG PREMIERPRO MIC LTX PF SZ8 BRN

## (undated) DEVICE — SUT ETHLN 3/0 FS1 663G

## (undated) DEVICE — HOLDER: Brand: DEROYAL

## (undated) DEVICE — BNDG ELAS MATRX V/CLS 2INX5YD LF

## (undated) DEVICE — PK EXTREM UPPR 70